# Patient Record
Sex: FEMALE | Race: WHITE | HISPANIC OR LATINO | Employment: FULL TIME | URBAN - METROPOLITAN AREA
[De-identification: names, ages, dates, MRNs, and addresses within clinical notes are randomized per-mention and may not be internally consistent; named-entity substitution may affect disease eponyms.]

---

## 2017-06-27 ENCOUNTER — HOSPITAL ENCOUNTER (OUTPATIENT)
Dept: LAB | Facility: MEDICAL CENTER | Age: 27
End: 2017-06-27
Attending: OBSTETRICS & GYNECOLOGY
Payer: COMMERCIAL

## 2017-06-27 LAB — B-HCG SERPL-ACNC: 14.4 MIU/ML (ref 0–5)

## 2017-06-27 PROCEDURE — 84702 CHORIONIC GONADOTROPIN TEST: CPT

## 2017-09-06 ENCOUNTER — HOSPITAL ENCOUNTER (OUTPATIENT)
Facility: MEDICAL CENTER | Age: 27
End: 2017-09-06
Attending: OBSTETRICS & GYNECOLOGY
Payer: COMMERCIAL

## 2017-09-06 LAB
ABO GROUP BLD: NORMAL
BASOPHILS # BLD AUTO: 0.2 % (ref 0–1.8)
BASOPHILS # BLD: 0.02 K/UL (ref 0–0.12)
BLD GP AB SCN SERPL QL: NORMAL
EOSINOPHIL # BLD AUTO: 0.04 K/UL (ref 0–0.51)
EOSINOPHIL NFR BLD: 0.4 % (ref 0–6.9)
ERYTHROCYTE [DISTWIDTH] IN BLOOD BY AUTOMATED COUNT: 45.4 FL (ref 35.9–50)
HBV SURFACE AG SER QL: NEGATIVE
HCT VFR BLD AUTO: 42.9 % (ref 37–47)
HGB BLD-MCNC: 13.8 G/DL (ref 12–16)
HIV 1+2 AB+HIV1 P24 AG SERPL QL IA: NON REACTIVE
IMM GRANULOCYTES # BLD AUTO: 0.04 K/UL (ref 0–0.11)
IMM GRANULOCYTES NFR BLD AUTO: 0.4 % (ref 0–0.9)
LYMPHOCYTES # BLD AUTO: 1.87 K/UL (ref 1–4.8)
LYMPHOCYTES NFR BLD: 19.4 % (ref 22–41)
MCH RBC QN AUTO: 31.3 PG (ref 27–33)
MCHC RBC AUTO-ENTMCNC: 32.2 G/DL (ref 33.6–35)
MCV RBC AUTO: 97.3 FL (ref 81.4–97.8)
MONOCYTES # BLD AUTO: 0.62 K/UL (ref 0–0.85)
MONOCYTES NFR BLD AUTO: 6.4 % (ref 0–13.4)
NEUTROPHILS # BLD AUTO: 7.06 K/UL (ref 2–7.15)
NEUTROPHILS NFR BLD: 73.2 % (ref 44–72)
NRBC # BLD AUTO: 0 K/UL
NRBC BLD AUTO-RTO: 0 /100 WBC
PLATELET # BLD AUTO: 245 K/UL (ref 164–446)
PMV BLD AUTO: 12.3 FL (ref 9–12.9)
RBC # BLD AUTO: 4.41 M/UL (ref 4.2–5.4)
RH BLD: NORMAL
RUBV AB SER QL: 112.2 IU/ML
TREPONEMA PALLIDUM IGG+IGM AB [PRESENCE] IN SERUM OR PLASMA BY IMMUNOASSAY: NON REACTIVE
WBC # BLD AUTO: 9.7 K/UL (ref 4.8–10.8)

## 2017-09-06 PROCEDURE — 86901 BLOOD TYPING SEROLOGIC RH(D): CPT

## 2017-09-06 PROCEDURE — 87077 CULTURE AEROBIC IDENTIFY: CPT

## 2017-09-06 PROCEDURE — 87186 SC STD MICRODIL/AGAR DIL: CPT

## 2017-09-06 PROCEDURE — 86850 RBC ANTIBODY SCREEN: CPT

## 2017-09-06 PROCEDURE — 87340 HEPATITIS B SURFACE AG IA: CPT

## 2017-09-06 PROCEDURE — 86762 RUBELLA ANTIBODY: CPT

## 2017-09-06 PROCEDURE — 85025 COMPLETE CBC W/AUTO DIFF WBC: CPT

## 2017-09-06 PROCEDURE — 87389 HIV-1 AG W/HIV-1&-2 AB AG IA: CPT

## 2017-09-06 PROCEDURE — 86780 TREPONEMA PALLIDUM: CPT

## 2017-09-06 PROCEDURE — 81220 CFTR GENE COM VARIANTS: CPT

## 2017-09-06 PROCEDURE — 87086 URINE CULTURE/COLONY COUNT: CPT

## 2017-09-06 PROCEDURE — 86900 BLOOD TYPING SEROLOGIC ABO: CPT

## 2017-09-09 LAB
BACTERIA UR CULT: ABNORMAL
SIGNIFICANT IND 70042: ABNORMAL
SOURCE SOURCE: ABNORMAL

## 2017-09-10 LAB
CF EXPANDED VARIANT PANEL INTERP Q4864: NORMAL
CFTR ALLELE 1 BLD/T QL: NEGATIVE
CFTR ALLELE 1 BLD/T QL: NEGATIVE
CFTR MUT ANL BLD/T: NORMAL

## 2017-10-02 ENCOUNTER — HOSPITAL ENCOUNTER (OUTPATIENT)
Dept: LAB | Facility: MEDICAL CENTER | Age: 27
End: 2017-10-02
Attending: OBSTETRICS & GYNECOLOGY
Payer: COMMERCIAL

## 2017-10-02 PROCEDURE — 81508 FTL CGEN ABNOR TWO PROTEINS: CPT

## 2017-10-02 PROCEDURE — 36415 COLL VENOUS BLD VENIPUNCTURE: CPT

## 2017-11-02 ENCOUNTER — HOSPITAL ENCOUNTER (OUTPATIENT)
Facility: MEDICAL CENTER | Age: 27
End: 2017-11-02
Attending: OBSTETRICS & GYNECOLOGY
Payer: COMMERCIAL

## 2017-11-02 PROCEDURE — 81511 FTL CGEN ABNOR FOUR ANAL: CPT

## 2018-01-24 ENCOUNTER — HOSPITAL ENCOUNTER (OUTPATIENT)
Facility: MEDICAL CENTER | Age: 28
End: 2018-01-24
Attending: OBSTETRICS & GYNECOLOGY
Payer: COMMERCIAL

## 2018-01-24 LAB
ERYTHROCYTE [DISTWIDTH] IN BLOOD BY AUTOMATED COUNT: 46.5 FL (ref 35.9–50)
GLUCOSE 1H P 50 G GLC PO SERPL-MCNC: 114 MG/DL (ref 70–139)
HCT VFR BLD AUTO: 36.5 % (ref 37–47)
HGB BLD-MCNC: 11.5 G/DL (ref 12–16)
MCH RBC QN AUTO: 30.3 PG (ref 27–33)
MCHC RBC AUTO-ENTMCNC: 31.5 G/DL (ref 33.6–35)
MCV RBC AUTO: 96.1 FL (ref 81.4–97.8)
PLATELET # BLD AUTO: 342 K/UL (ref 164–446)
PMV BLD AUTO: 11.1 FL (ref 9–12.9)
RBC # BLD AUTO: 3.8 M/UL (ref 4.2–5.4)
WBC # BLD AUTO: 9.8 K/UL (ref 4.8–10.8)

## 2018-01-24 PROCEDURE — 82950 GLUCOSE TEST: CPT

## 2018-01-24 PROCEDURE — 85027 COMPLETE CBC AUTOMATED: CPT

## 2018-03-28 ENCOUNTER — HOSPITAL ENCOUNTER (INPATIENT)
Facility: MEDICAL CENTER | Age: 28
LOS: 3 days | End: 2018-03-31
Attending: OBSTETRICS & GYNECOLOGY | Admitting: OBSTETRICS & GYNECOLOGY
Payer: COMMERCIAL

## 2018-03-28 DIAGNOSIS — G89.18 ACUTE POST-OPERATIVE PAIN: ICD-10-CM

## 2018-03-28 DIAGNOSIS — Z98.891 STATUS POST PRIMARY LOW TRANSVERSE CESAREAN SECTION: ICD-10-CM

## 2018-03-28 LAB
BASOPHILS # BLD AUTO: 0.2 % (ref 0–1.8)
BASOPHILS # BLD: 0.02 K/UL (ref 0–0.12)
EOSINOPHIL # BLD AUTO: 0.1 K/UL (ref 0–0.51)
EOSINOPHIL NFR BLD: 0.9 % (ref 0–6.9)
ERYTHROCYTE [DISTWIDTH] IN BLOOD BY AUTOMATED COUNT: 47.4 FL (ref 35.9–50)
ERYTHROCYTE [DISTWIDTH] IN BLOOD BY AUTOMATED COUNT: 48.2 FL (ref 35.9–50)
HCT VFR BLD AUTO: 33.1 % (ref 37–47)
HCT VFR BLD AUTO: 39 % (ref 37–47)
HGB BLD-MCNC: 11.1 G/DL (ref 12–16)
HGB BLD-MCNC: 13.1 G/DL (ref 12–16)
HOLDING TUBE BB 8507: NORMAL
IMM GRANULOCYTES # BLD AUTO: 0.05 K/UL (ref 0–0.11)
IMM GRANULOCYTES NFR BLD AUTO: 0.5 % (ref 0–0.9)
LYMPHOCYTES # BLD AUTO: 2.26 K/UL (ref 1–4.8)
LYMPHOCYTES NFR BLD: 20.7 % (ref 22–41)
MCH RBC QN AUTO: 30.5 PG (ref 27–33)
MCH RBC QN AUTO: 30.6 PG (ref 27–33)
MCHC RBC AUTO-ENTMCNC: 33.5 G/DL (ref 33.6–35)
MCHC RBC AUTO-ENTMCNC: 33.6 G/DL (ref 33.6–35)
MCV RBC AUTO: 90.7 FL (ref 81.4–97.8)
MCV RBC AUTO: 91.2 FL (ref 81.4–97.8)
MONOCYTES # BLD AUTO: 0.8 K/UL (ref 0–0.85)
MONOCYTES NFR BLD AUTO: 7.3 % (ref 0–13.4)
NEUTROPHILS # BLD AUTO: 7.68 K/UL (ref 2–7.15)
NEUTROPHILS NFR BLD: 70.4 % (ref 44–72)
NRBC # BLD AUTO: 0 K/UL
NRBC BLD-RTO: 0 /100 WBC
PLATELET # BLD AUTO: 215 K/UL (ref 164–446)
PLATELET # BLD AUTO: 229 K/UL (ref 164–446)
PMV BLD AUTO: 12.2 FL (ref 9–12.9)
PMV BLD AUTO: 12.2 FL (ref 9–12.9)
RBC # BLD AUTO: 3.63 M/UL (ref 4.2–5.4)
RBC # BLD AUTO: 4.3 M/UL (ref 4.2–5.4)
WBC # BLD AUTO: 10.9 K/UL (ref 4.8–10.8)
WBC # BLD AUTO: 19.5 K/UL (ref 4.8–10.8)

## 2018-03-28 PROCEDURE — 700102 HCHG RX REV CODE 250 W/ 637 OVERRIDE(OP)

## 2018-03-28 PROCEDURE — 85025 COMPLETE CBC W/AUTO DIFF WBC: CPT

## 2018-03-28 PROCEDURE — 700111 HCHG RX REV CODE 636 W/ 250 OVERRIDE (IP)

## 2018-03-28 PROCEDURE — 770002 HCHG ROOM/CARE - OB PRIVATE (112)

## 2018-03-28 PROCEDURE — 303615 HCHG EPIDURAL/SPINAL ANESTHESIA FOR LABOR

## 2018-03-28 PROCEDURE — 36415 COLL VENOUS BLD VENIPUNCTURE: CPT

## 2018-03-28 PROCEDURE — 305385 HCHG SURGICAL SERVICES 1/4 HOUR: Performed by: OBSTETRICS & GYNECOLOGY

## 2018-03-28 PROCEDURE — A9270 NON-COVERED ITEM OR SERVICE: HCPCS | Performed by: ANESTHESIOLOGY

## 2018-03-28 PROCEDURE — 59514 CESAREAN DELIVERY ONLY: CPT

## 2018-03-28 PROCEDURE — 700105 HCHG RX REV CODE 258: Performed by: OBSTETRICS & GYNECOLOGY

## 2018-03-28 PROCEDURE — 304966 HCHG RECOVERY SVSC TIME ADDL 1/2 HR: Performed by: OBSTETRICS & GYNECOLOGY

## 2018-03-28 PROCEDURE — 306828 HCHG ANES-TIME GENERAL: Performed by: OBSTETRICS & GYNECOLOGY

## 2018-03-28 PROCEDURE — 85027 COMPLETE CBC AUTOMATED: CPT

## 2018-03-28 PROCEDURE — 700101 HCHG RX REV CODE 250

## 2018-03-28 PROCEDURE — 304964 HCHG RECOVERY ROOM TIME 1HR: Performed by: OBSTETRICS & GYNECOLOGY

## 2018-03-28 PROCEDURE — 700102 HCHG RX REV CODE 250 W/ 637 OVERRIDE(OP): Performed by: ANESTHESIOLOGY

## 2018-03-28 RX ORDER — BUPIVACAINE HYDROCHLORIDE 2.5 MG/ML
INJECTION, SOLUTION EPIDURAL; INFILTRATION; INTRACAUDAL
Status: COMPLETED
Start: 2018-03-28 | End: 2018-03-28

## 2018-03-28 RX ORDER — ONDANSETRON 4 MG/1
4 TABLET, ORALLY DISINTEGRATING ORAL EVERY 6 HOURS PRN
Status: DISCONTINUED | OUTPATIENT
Start: 2018-03-29 | End: 2018-03-31 | Stop reason: HOSPADM

## 2018-03-28 RX ORDER — OXYCODONE AND ACETAMINOPHEN 10; 325 MG/1; MG/1
1 TABLET ORAL EVERY 4 HOURS PRN
Status: DISCONTINUED | OUTPATIENT
Start: 2018-03-28 | End: 2018-03-28

## 2018-03-28 RX ORDER — ONDANSETRON 2 MG/ML
4 INJECTION INTRAMUSCULAR; INTRAVENOUS EVERY 6 HOURS PRN
Status: DISCONTINUED | OUTPATIENT
Start: 2018-03-28 | End: 2018-03-29

## 2018-03-28 RX ORDER — DIPHENHYDRAMINE HYDROCHLORIDE 50 MG/ML
25 INJECTION INTRAMUSCULAR; INTRAVENOUS EVERY 6 HOURS PRN
Status: DISCONTINUED | OUTPATIENT
Start: 2018-03-28 | End: 2018-03-28

## 2018-03-28 RX ORDER — NALOXONE HYDROCHLORIDE 0.4 MG/ML
0.1 INJECTION, SOLUTION INTRAMUSCULAR; INTRAVENOUS; SUBCUTANEOUS PRN
Status: DISCONTINUED | OUTPATIENT
Start: 2018-03-28 | End: 2018-03-29

## 2018-03-28 RX ORDER — OXYCODONE AND ACETAMINOPHEN 10; 325 MG/1; MG/1
1 TABLET ORAL EVERY 4 HOURS PRN
Status: DISCONTINUED | OUTPATIENT
Start: 2018-03-28 | End: 2018-03-29

## 2018-03-28 RX ORDER — METOCLOPRAMIDE HYDROCHLORIDE 5 MG/ML
INJECTION INTRAMUSCULAR; INTRAVENOUS
Status: COMPLETED
Start: 2018-03-28 | End: 2018-03-28

## 2018-03-28 RX ORDER — IBUPROFEN 600 MG/1
600 TABLET ORAL EVERY 6 HOURS PRN
Status: DISCONTINUED | OUTPATIENT
Start: 2018-03-28 | End: 2018-03-28

## 2018-03-28 RX ORDER — OXYCODONE HYDROCHLORIDE AND ACETAMINOPHEN 5; 325 MG/1; MG/1
1 TABLET ORAL EVERY 4 HOURS PRN
Status: DISCONTINUED | OUTPATIENT
Start: 2018-03-28 | End: 2018-03-29

## 2018-03-28 RX ORDER — MORPHINE SULFATE 4 MG/ML
4 INJECTION, SOLUTION INTRAMUSCULAR; INTRAVENOUS
Status: DISCONTINUED | OUTPATIENT
Start: 2018-03-29 | End: 2018-03-31 | Stop reason: HOSPADM

## 2018-03-28 RX ORDER — OXYCODONE AND ACETAMINOPHEN 10; 325 MG/1; MG/1
1 TABLET ORAL EVERY 4 HOURS PRN
Status: DISCONTINUED | OUTPATIENT
Start: 2018-03-29 | End: 2018-03-29

## 2018-03-28 RX ORDER — MAG HYDROX/ALUMINUM HYD/SIMETH 400-400-40
1 SUSPENSION, ORAL (FINAL DOSE FORM) ORAL
Status: DISCONTINUED | OUTPATIENT
Start: 2018-03-28 | End: 2018-03-31 | Stop reason: HOSPADM

## 2018-03-28 RX ORDER — DIPHENHYDRAMINE HYDROCHLORIDE 50 MG/ML
25 INJECTION INTRAMUSCULAR; INTRAVENOUS EVERY 6 HOURS PRN
Status: DISCONTINUED | OUTPATIENT
Start: 2018-03-29 | End: 2018-03-31 | Stop reason: HOSPADM

## 2018-03-28 RX ORDER — IBUPROFEN 600 MG/1
600 TABLET ORAL EVERY 6 HOURS PRN
Status: DISCONTINUED | OUTPATIENT
Start: 2018-03-29 | End: 2018-03-31 | Stop reason: HOSPADM

## 2018-03-28 RX ORDER — SIMETHICONE 80 MG
80 TABLET,CHEWABLE ORAL 4 TIMES DAILY PRN
Status: DISCONTINUED | OUTPATIENT
Start: 2018-03-28 | End: 2018-03-31 | Stop reason: HOSPADM

## 2018-03-28 RX ORDER — ONDANSETRON 2 MG/ML
4 INJECTION INTRAMUSCULAR; INTRAVENOUS EVERY 6 HOURS PRN
Status: DISCONTINUED | OUTPATIENT
Start: 2018-03-29 | End: 2018-03-31 | Stop reason: HOSPADM

## 2018-03-28 RX ORDER — TERBUTALINE SULFATE 1 MG/ML
INJECTION, SOLUTION SUBCUTANEOUS
Status: COMPLETED
Start: 2018-03-28 | End: 2018-03-28

## 2018-03-28 RX ORDER — DIPHENHYDRAMINE HCL 25 MG
25 TABLET ORAL EVERY 6 HOURS PRN
Status: DISCONTINUED | OUTPATIENT
Start: 2018-03-29 | End: 2018-03-31 | Stop reason: HOSPADM

## 2018-03-28 RX ORDER — DIPHENHYDRAMINE HCL 25 MG
25 TABLET ORAL EVERY 6 HOURS PRN
Status: DISCONTINUED | OUTPATIENT
Start: 2018-03-28 | End: 2018-03-28

## 2018-03-28 RX ORDER — CARBOPROST TROMETHAMINE 250 UG/ML
250 INJECTION, SOLUTION INTRAMUSCULAR
Status: DISCONTINUED | OUTPATIENT
Start: 2018-03-28 | End: 2018-03-31 | Stop reason: HOSPADM

## 2018-03-28 RX ORDER — VITAMIN A ACETATE, BETA CAROTENE, ASCORBIC ACID, CHOLECALCIFEROL, .ALPHA.-TOCOPHEROL ACETATE, DL-, THIAMINE MONONITRATE, RIBOFLAVIN, NIACINAMIDE, PYRIDOXINE HYDROCHLORIDE, FOLIC ACID, CYANOCOBALAMIN, CALCIUM CARBONATE, FERROUS FUMARATE, ZINC OXIDE, CUPRIC OXIDE 3080; 12; 120; 400; 1; 1.84; 3; 20; 22; 920; 25; 200; 27; 10; 2 [IU]/1; UG/1; MG/1; [IU]/1; MG/1; MG/1; MG/1; MG/1; MG/1; [IU]/1; MG/1; MG/1; MG/1; MG/1; MG/1
1 TABLET, FILM COATED ORAL EVERY MORNING
Status: DISCONTINUED | OUTPATIENT
Start: 2018-03-29 | End: 2018-03-31 | Stop reason: HOSPADM

## 2018-03-28 RX ORDER — OXYCODONE HYDROCHLORIDE AND ACETAMINOPHEN 5; 325 MG/1; MG/1
1 TABLET ORAL EVERY 4 HOURS PRN
Status: DISCONTINUED | OUTPATIENT
Start: 2018-03-28 | End: 2018-03-28

## 2018-03-28 RX ORDER — ONDANSETRON 2 MG/ML
4 INJECTION INTRAMUSCULAR; INTRAVENOUS EVERY 6 HOURS PRN
Status: DISCONTINUED | OUTPATIENT
Start: 2018-03-28 | End: 2018-03-28

## 2018-03-28 RX ORDER — ONDANSETRON 4 MG/1
4 TABLET, ORALLY DISINTEGRATING ORAL EVERY 6 HOURS PRN
Status: DISCONTINUED | OUTPATIENT
Start: 2018-03-28 | End: 2018-03-28

## 2018-03-28 RX ORDER — SODIUM CHLORIDE, SODIUM LACTATE, POTASSIUM CHLORIDE, CALCIUM CHLORIDE 600; 310; 30; 20 MG/100ML; MG/100ML; MG/100ML; MG/100ML
INJECTION, SOLUTION INTRAVENOUS CONTINUOUS
Status: DISPENSED | OUTPATIENT
Start: 2018-03-28 | End: 2018-03-28

## 2018-03-28 RX ORDER — ROPIVACAINE HYDROCHLORIDE 2 MG/ML
INJECTION, SOLUTION EPIDURAL; INFILTRATION; PERINEURAL
Status: COMPLETED
Start: 2018-03-28 | End: 2018-03-28

## 2018-03-28 RX ORDER — METOCLOPRAMIDE HYDROCHLORIDE 5 MG/ML
10 INJECTION INTRAMUSCULAR; INTRAVENOUS EVERY 6 HOURS PRN
Status: DISCONTINUED | OUTPATIENT
Start: 2018-03-28 | End: 2018-03-31 | Stop reason: HOSPADM

## 2018-03-28 RX ORDER — OXYCODONE HYDROCHLORIDE AND ACETAMINOPHEN 5; 325 MG/1; MG/1
1 TABLET ORAL EVERY 4 HOURS PRN
Status: DISCONTINUED | OUTPATIENT
Start: 2018-03-29 | End: 2018-03-29

## 2018-03-28 RX ORDER — DOCUSATE SODIUM 100 MG/1
100 CAPSULE, LIQUID FILLED ORAL 2 TIMES DAILY PRN
Status: DISCONTINUED | OUTPATIENT
Start: 2018-03-28 | End: 2018-03-31 | Stop reason: HOSPADM

## 2018-03-28 RX ORDER — SODIUM CHLORIDE, SODIUM LACTATE, POTASSIUM CHLORIDE, CALCIUM CHLORIDE 600; 310; 30; 20 MG/100ML; MG/100ML; MG/100ML; MG/100ML
INJECTION, SOLUTION INTRAVENOUS PRN
Status: DISCONTINUED | OUTPATIENT
Start: 2018-03-28 | End: 2018-03-31 | Stop reason: HOSPADM

## 2018-03-28 RX ORDER — ACETAMINOPHEN 325 MG/1
325 TABLET ORAL EVERY 4 HOURS PRN
Status: DISCONTINUED | OUTPATIENT
Start: 2018-03-28 | End: 2018-03-31 | Stop reason: HOSPADM

## 2018-03-28 RX ORDER — MORPHINE SULFATE 4 MG/ML
4 INJECTION, SOLUTION INTRAMUSCULAR; INTRAVENOUS
Status: DISCONTINUED | OUTPATIENT
Start: 2018-03-28 | End: 2018-03-28

## 2018-03-28 RX ORDER — MISOPROSTOL 200 UG/1
600 TABLET ORAL
Status: DISCONTINUED | OUTPATIENT
Start: 2018-03-28 | End: 2018-03-31 | Stop reason: HOSPADM

## 2018-03-28 RX ORDER — BISACODYL 10 MG
10 SUPPOSITORY, RECTAL RECTAL PRN
Status: DISCONTINUED | OUTPATIENT
Start: 2018-03-28 | End: 2018-03-31 | Stop reason: HOSPADM

## 2018-03-28 RX ORDER — CITRIC ACID/SODIUM CITRATE 334-500MG
SOLUTION, ORAL ORAL
Status: COMPLETED
Start: 2018-03-28 | End: 2018-03-28

## 2018-03-28 RX ORDER — DIPHENHYDRAMINE HYDROCHLORIDE 50 MG/ML
12.5 INJECTION INTRAMUSCULAR; INTRAVENOUS EVERY 6 HOURS PRN
Status: DISCONTINUED | OUTPATIENT
Start: 2018-03-28 | End: 2018-03-29

## 2018-03-28 RX ADMIN — METOCLOPRAMIDE 10 MG: 5 INJECTION, SOLUTION INTRAMUSCULAR; INTRAVENOUS at 14:03

## 2018-03-28 RX ADMIN — SODIUM CHLORIDE, POTASSIUM CHLORIDE, SODIUM LACTATE AND CALCIUM CHLORIDE: 600; 310; 30; 20 INJECTION, SOLUTION INTRAVENOUS at 06:05

## 2018-03-28 RX ADMIN — OXYCODONE HYDROCHLORIDE AND ACETAMINOPHEN 1 TABLET: 10; 325 TABLET ORAL at 22:33

## 2018-03-28 RX ADMIN — SODIUM CHLORIDE, POTASSIUM CHLORIDE, SODIUM LACTATE AND CALCIUM CHLORIDE: 600; 310; 30; 20 INJECTION, SOLUTION INTRAVENOUS at 14:03

## 2018-03-28 RX ADMIN — ROPIVACAINE HYDROCHLORIDE 100 ML: 2 INJECTION, SOLUTION EPIDURAL; INFILTRATION at 06:28

## 2018-03-28 RX ADMIN — FENTANYL CITRATE: 50 INJECTION, SOLUTION INTRAMUSCULAR; INTRAVENOUS at 06:30

## 2018-03-28 RX ADMIN — SODIUM CITRATE AND CITRIC ACID MONOHYDRATE 30 ML: 500; 334 SOLUTION ORAL at 14:03

## 2018-03-28 RX ADMIN — BUPIVACAINE HYDROCHLORIDE: 2.5 INJECTION, SOLUTION EPIDURAL; INFILTRATION; INTRACAUDAL; PERINEURAL at 06:30

## 2018-03-28 RX ADMIN — SODIUM CHLORIDE, POTASSIUM CHLORIDE, SODIUM LACTATE AND CALCIUM CHLORIDE: 600; 310; 30; 20 INJECTION, SOLUTION INTRAVENOUS at 05:00

## 2018-03-28 RX ADMIN — Medication 20 UNITS: at 15:46

## 2018-03-28 ASSESSMENT — LIFESTYLE VARIABLES
DO YOU DRINK ALCOHOL: NO
EVER_SMOKED: NEVER
ALCOHOL_USE: NO

## 2018-03-28 ASSESSMENT — PAIN SCALES - GENERAL
PAINLEVEL_OUTOF10: 0
PAINLEVEL_OUTOF10: 1
PAINLEVEL_OUTOF10: 0
PAINLEVEL_OUTOF10: 1
PAINLEVEL_OUTOF10: 0

## 2018-03-28 NOTE — PROGRESS NOTES
26yo  EDC 2018 38w4d present here today for SROM that she states happened at 0200am this morning. Pt denies problems during her pregnancy. Denies VB. Pt states shes had UC's since her SROM, is breathing through her contractions. Report +FM.    0340: Sterile Speculum exam, gross pooling noted. SVE, /-2 done by WON Momin RN. Dr. Leigh notified. Admission orders received.    0400: IV started and labs sent.0409: Pt transferred to L&D room 213. Report given to WALTER Camilo RN.

## 2018-03-28 NOTE — CARE PLAN
Problem: Infection  Goal: Will remain free from infection  Outcome: PROGRESSING AS EXPECTED  ROM: clear, no foul odor. Afebrile. Wisdom inserted w/ sterile field.     Problem: Pain  Goal: Alleviation of Pain or a reduction in pain to the patient's comfort goal  Outcome: PROGRESSING AS EXPECTED  Epidural in place. Pt states no pain at this time. Will continue to monitor and reposition for comfort.

## 2018-03-28 NOTE — PROGRESS NOTES
Progress Note    Epidural in place. Comfortable. Continued leaking fluid. SVE 6/C/-1. Tocometer with contractions every 2-3 minutes. Will continue to monitor.

## 2018-03-28 NOTE — OP REPORT
DATE OF SERVICE:  2018    PREOPERATIVE DIAGNOSES:  1.  Term intrauterine pregnancy at 38 weeks and 1 day.  2.  Spontaneous rupture of membranes.  3.  Arrest of descent in second stage labor.  4.  Suspected fetal malposition.    POSTOPERATIVE DIAGNOSES:  1.  Term intrauterine pregnancy at 38 weeks and 1 day.  2.  Spontaneous rupture of membranes.  3.  Arrest of descent in second stage labor.  4.  Suspected fetal malposition.    PROCEDURE:  Primary low transverse  via Pfannenstiel incision.    SURGEON:  Kimmie Hinds MD    ASSISTANT:  Simeon Singh MD    ANESTHESIA:  Epidural.    ESTIMATED BLOOD LOSS:  1000 mL    IV FLUIDS:  1700 mL    URINE OUTPUT:  35 mL    SPECIMENS:  None.    COMPLICATIONS:  Left lateral uterine extension.    DISPOSITION:  To PACU stable.    INTRAOPERATIVE FINDINGS:  Normal-appearing uterus, fallopian tubes and ovaries   bilaterally.  Male infant in cephalic presentation, occiput transverse   position with Apgars of 8 and 9, spontaneous cry at delivery.    INDICATIONS AND CONSENT:  The patient is a 27-year-old  who presented at   38 weeks and 1 day with complaints of contractions and leaking fluid.  She is   found to be spontaneously ruptured and in labor.  She was admitted.  She   received an epidural for anesthesia and she progressed into labor without any   further augmentation to complete dilation where she was noted to be at +1   station.  Pushing efforts were initiated.  She pushed for approximately an  hour without descent. Occiput posterior position was suspected.  She was then pushed   in left lateral position.  She was moved to all-fours position and did continue   her pushing efforts.  When she was prone again, evaluation of fetal position   was thought to be unchanged.  She continued her pushing efforts for   approximately 30 minutes without fetal descent.  At this point, she was   requesting  delivery.  I discussed the risks, benefits and    alternatives of primary low transverse  delivery via Pfannenstiel   incision.  The patient stated understanding and desired to proceed.    PROCEDURE IN DETAIL:  The patient was taken back to the operating room where   her epidural anesthesia was bolused.  She was prepped and draped in dorsal   supine position with leftward tilt.  A time-out was performed.  A Pfannenstiel   incision was made on her skin with a scalpel.  The incision was carried down   to the fascia with the Bovie.  The fascia was incised and extended laterally.    The superior and inferior aspects of the fascia were grasped with Kocher   clamps and the rectus muscles were dissected off.  The rectus muscles were    and the peritoneum was entered bluntly.  The incision was extended   superiorly and inferiorly to good visualization of the bladder.  An Sandro O   retractor was placed.  An incision was made in the lower uterine segment with   a scalpel.  The incision was extended superiorly and inferiorly.  Deep transverse arrest   was noted.  With gentle pressure, suction was released around the head and it    was flexed to the hysterotomy. It was delivered with gentle fundal pressure.  The   remainder of the body was delivered without difficulty.  The cord was clamped   and cut.  The infant was handed to NICU.  The placenta was removed with   gentle traction.  The interior lining of the uterus was wiped clean with moist   laparotomy sponges and the uterus was examined.  An extension was noted down   to the cervix on the left side, so the uterus was exteriorized for better   visualization.  The upper and lower uterine segments were reapproximated in   the middle with 0 chromic and was carried out to the right hand side.  Good   hemostasis was noted.  An additional reapproximation was performed in the   center and the incision was carried down to the left side.  At this point, the   extension down into the cervix was appreciated.  The  edges were   reapproximated and sewing up towards the original hysterotomy with 0 chromic,   this extension was reapproximated.  Good hemostasis was noted.  The anterior   leaf of the broad ligament was then reapproximated side-to-side through the   original hysterotomy for additional hemostasis.  Irrigation was performed.    Good hemostasis was noted.  The bilateral fallopian tubes and ovaries were   found to be normal in appearance.  The uterus was replaced into the peritoneal   cavity.  Irrigation was again performed.  The Sandro O retractor was removed.    The peritoneum was reapproximated with 3-0 Vicryl.  The rectus muscles were   reapproximated with 3-0 Vicryl.  The fascia was reapproximated with 0 PDS.    The subcutaneous space was reapproximated with 3-0 Monocryl and the skin was   reapproximated with 4-0 Monocryl.  All counts were correct x2.       ____________________________________     MD JESSI Sanders / JUAN ANTONIO    DD:  03/28/2018 15:32:10  DT:  03/28/2018 16:02:43    D#:  [id number]  Job#:  [id number]

## 2018-03-28 NOTE — PROGRESS NOTES
0700 Received report from Cornel Dorsey. Pt laying in bed w/ no s/s of acute distress. Epidural in place. Pt states no pain. Wisdom cath inserted by this Rn.     0715 Dr. Hinds at bedside. SVE: 6/100%/-2. Dr. Hinds informed of no GBS lab copy on file. MD will fax paperwork.     0830 SVE by this RN and Cornel Burnett: 6/100/-2. Pt repositioned to R side w/ peanut ball.      0930 SVE by Cornel Burnett: 7/100/-1. Pt repositioned to L side. Dr. Hinds updated on cervical change; orders to continue to monitor and frequent position changes.     1030  SVE by Cornel Burnett: 9/100/0.     1045 Dr. Hinds updated on labor status.     1148 Dr. Hinds at bedside. Pushing efforts began w/ pt in supine position w/ L wedge.      1255 Suspected OP position by Dr. Hinds. Pt repositioned to L lateral. Pt pushing well with Dr. Hinds. Dr Hinds continues to be at bedside.     1310 No progress noted w/ fetal descent. Pt repositioned to hands and knees. Pushing efforts continued.     1330 Pt repositioned back to supine. Pushing efforts continued. Dr. Hinds suspecting no fetal position changes. Still suspecting OP or OT position. Pt requesting C/S. Dr. Hinds consented pt for primary c/s.    1400 Prepped for C/S.     1416 Pt to OR #2 for primary C/S.    1700 Pt transferred to PP . Report given to Cornel Gallegos.

## 2018-03-28 NOTE — CARE PLAN
Problem: Pain  Goal: Patient will have relaxed facial expressions and be able to rest between uterine contractions  Outcome: PROGRESSING AS EXPECTED  Resting and relaxed between UCs, reports excellent pain relief with epidural analgesia.     Problem: Risk for Infection, Impaired Wound Healing  Goal: Remain free from signs and symptoms of infection  Outcome: PROGRESSING AS EXPECTED  VSS, no s/s of infection noted upon assessment.

## 2018-03-28 NOTE — PROGRESS NOTES
OB Progress Note    SVE C/C/+1. Pushing efforts began at 11:48. Suspected OP position. At >1 pushing with no descensus, patient placed in left lateral position and pushing efforts continued. No movement noted. She was transitioned to hands and knees and pushing efforts continued. No descensus appreciated. She was placed in supine lithotomy again and pushing efforts were continued. Suspected alternating between OP and OT position. Digital rotation attempted but unsuccessful. Cat II tracing noted but overall reassuring. At 2 hours of pushing, no progress noted and patient is requesting  delivery. Discussed the risks, benefits and alternatives to PLTCS. Patient stated understanding and desires to proceed.

## 2018-03-28 NOTE — PROGRESS NOTES
0400 - report from WON Momin RN. LUIS EDUARDO Bray at bedside. POC discussed, educated on hand hygiene, call light, emergency light, [facility] and [employer]. Educated on fetal monitoring, admission procedures completed. Questions encouraged and answered, understanding verbalized.    0500 - requesting IV be replaced, uncomfortable with current location, concerned with holding baby, breastfeeding. Replaced in left forearm, one attempt, tolerated well.   0615 - care of patient turned over to REID Galdamez for epidural placement.   0645 - care reassumed.   0700 - report to JEB Burnett RN.

## 2018-03-28 NOTE — OP REPORT
Brief Operative Note    Pre Operative Diagnosis:  1. Term intrauterine pregnancy 38w1d  2. SROM  3. Arrest of descent in second stage labor   4. Suspected fetal malposition    Post Operative Diagnosis  1. Term intrauterine pregnancy 38w1d  2. SROM  3. Arrest of descent in second stage labor   4. Suspected fetal malposition    Procedure: Primary low transverse  delivery via Pfannenstiel incision    Surgeon: Guzman RUBIN    Assistant:Francisco RUBIN    Intraoperative findings: Normal appearing uterus, fallopian tubes and ovaries bilaterally, male infant in cephalic presentation, occiput transverse position with APGARS 8/9, spontaneous cry at delivery    Anesthesia:epidural    EBL:1000cc    IVF:1700cc    UOP:35cc    Specimens:none    Complications:left lateral uterine extension    Dispo:to PACU stable    Kimmie Hinds M.D.

## 2018-03-28 NOTE — H&P
DATE OF ADMISSION:  2018    CHIEF COMPLAINT:  Leaking fluid and contractions.    HISTORY OF PRESENT ILLNESS:  This is a 27-year-old  3, para 1 female   due on , presenting at 38-1/2 weeks' gestation with history of   rupture of membranes, which occurred approximately 2 hours prior to admission.    On initial examination in the triage unit, it was confirmed that she had   spontaneous rupture of membranes and was 4 cm dilated.    PRENATAL HISTORY:  The patient had prenatal care with Dr. Kimmie Hinds since   approximately 10 weeks' gestational age.  She has had comprehensive prenatal   care without identified risk factors with negative group B strep status.    OBSTETRICAL HISTORY:  She has had a previous term pregnancy.    MEDICAL HISTORY:  She has no significant medical illnesses or prior   hospitalizations or operations.    ALLERGIES:  No known drug allergies.    FAMILY HISTORY:  Noncontributory.    SOCIAL HISTORY:  She is , works in pharmacy.  Denies drug or tobacco   use.    REVIEW OF SYSTEMS:  She has had no recent fever or vaginal bleeding.    PHYSICAL EXAMINATION:  GENERAL:  She is pleasant, healthy appearing, in no distress.  LUNGS:  Clear.  HEART:  Regular rate and rhythm.  ABDOMEN:  Estimated fetal weight is 7 pounds.  Fetal heart tones are present   with category 1 tracing noted.  GYNECOLOGIC:  Not repeated.    IMPRESSION:  1.  Term pregnancy.  2.  Spontaneous rupture of membranes in active labor.    PLAN:  The patient is admitted, has an epidural anesthetic for pain relief.    We will watch for continued progress and anticipate vaginal delivery.       ____________________________________     MD FLOWER DELA CRUZ / JUAN ANTONIO    DD:  2018 06:53:32  DT:  2018 07:44:44    D#:  [id number]  Job#:  [id number]

## 2018-03-29 PROCEDURE — A9270 NON-COVERED ITEM OR SERVICE: HCPCS | Performed by: OBSTETRICS & GYNECOLOGY

## 2018-03-29 PROCEDURE — A9270 NON-COVERED ITEM OR SERVICE: HCPCS | Performed by: ANESTHESIOLOGY

## 2018-03-29 PROCEDURE — 700112 HCHG RX REV CODE 229: Performed by: OBSTETRICS & GYNECOLOGY

## 2018-03-29 PROCEDURE — 700102 HCHG RX REV CODE 250 W/ 637 OVERRIDE(OP): Performed by: ANESTHESIOLOGY

## 2018-03-29 PROCEDURE — 700102 HCHG RX REV CODE 250 W/ 637 OVERRIDE(OP): Performed by: OBSTETRICS & GYNECOLOGY

## 2018-03-29 PROCEDURE — 770002 HCHG ROOM/CARE - OB PRIVATE (112)

## 2018-03-29 RX ORDER — ONDANSETRON 4 MG/1
4 TABLET, ORALLY DISINTEGRATING ORAL EVERY 6 HOURS PRN
Status: DISCONTINUED | OUTPATIENT
Start: 2018-03-29 | End: 2018-03-31 | Stop reason: HOSPADM

## 2018-03-29 RX ORDER — OXYCODONE AND ACETAMINOPHEN 10; 325 MG/1; MG/1
1 TABLET ORAL EVERY 4 HOURS PRN
Status: DISCONTINUED | OUTPATIENT
Start: 2018-03-29 | End: 2018-03-31 | Stop reason: HOSPADM

## 2018-03-29 RX ORDER — ONDANSETRON 2 MG/ML
4 INJECTION INTRAMUSCULAR; INTRAVENOUS EVERY 6 HOURS PRN
Status: DISCONTINUED | OUTPATIENT
Start: 2018-03-29 | End: 2018-03-31 | Stop reason: HOSPADM

## 2018-03-29 RX ORDER — IBUPROFEN 600 MG/1
600 TABLET ORAL EVERY 6 HOURS PRN
Status: DISCONTINUED | OUTPATIENT
Start: 2018-03-29 | End: 2018-03-31 | Stop reason: HOSPADM

## 2018-03-29 RX ORDER — OXYCODONE HYDROCHLORIDE AND ACETAMINOPHEN 5; 325 MG/1; MG/1
1 TABLET ORAL EVERY 4 HOURS PRN
Status: DISCONTINUED | OUTPATIENT
Start: 2018-03-29 | End: 2018-03-31 | Stop reason: HOSPADM

## 2018-03-29 RX ORDER — ACETAMINOPHEN 325 MG/1
325 TABLET ORAL EVERY 4 HOURS PRN
Status: DISCONTINUED | OUTPATIENT
Start: 2018-03-29 | End: 2018-03-31 | Stop reason: HOSPADM

## 2018-03-29 RX ADMIN — OXYCODONE HYDROCHLORIDE AND ACETAMINOPHEN 1 TABLET: 10; 325 TABLET ORAL at 11:20

## 2018-03-29 RX ADMIN — Medication 1 TABLET: at 11:19

## 2018-03-29 RX ADMIN — OXYCODONE HYDROCHLORIDE AND ACETAMINOPHEN 1 TABLET: 10; 325 TABLET ORAL at 15:40

## 2018-03-29 RX ADMIN — Medication 1 TABLET: at 11:20

## 2018-03-29 RX ADMIN — DOCUSATE SODIUM 100 MG: 100 CAPSULE ORAL at 15:43

## 2018-03-29 RX ADMIN — DOCUSATE SODIUM 100 MG: 100 CAPSULE ORAL at 02:57

## 2018-03-29 RX ADMIN — OXYCODONE HYDROCHLORIDE AND ACETAMINOPHEN 1 TABLET: 10; 325 TABLET ORAL at 20:24

## 2018-03-29 RX ADMIN — IBUPROFEN 600 MG: 600 TABLET, FILM COATED ORAL at 15:44

## 2018-03-29 RX ADMIN — OXYCODONE HYDROCHLORIDE AND ACETAMINOPHEN 1 TABLET: 10; 325 TABLET ORAL at 15:44

## 2018-03-29 RX ADMIN — OXYCODONE HYDROCHLORIDE AND ACETAMINOPHEN 1 TABLET: 10; 325 TABLET ORAL at 07:20

## 2018-03-29 RX ADMIN — OXYCODONE HYDROCHLORIDE AND ACETAMINOPHEN 1 TABLET: 10; 325 TABLET ORAL at 02:58

## 2018-03-29 RX ADMIN — IBUPROFEN 600 MG: 600 TABLET, FILM COATED ORAL at 23:30

## 2018-03-29 ASSESSMENT — PAIN SCALES - GENERAL
PAINLEVEL_OUTOF10: 9
PAINLEVEL_OUTOF10: 7
PAINLEVEL_OUTOF10: 8
PAINLEVEL_OUTOF10: 0
PAINLEVEL_OUTOF10: 0

## 2018-03-29 NOTE — PROGRESS NOTES
1703 - Pt arrived via [facility] with belongings to room S351. Patient transferred from [facility] to hospital bed without difficulties and tolerated well. Report received from CAROLINA Ruiz. Pt oriented to room, call light, emergency light, skylight, & infant security.   1715 - Assessment done. Fundus firm, lochia light. Vital signs stable. IV infusing 125 of pit in L FA. Wisdom catheter in place, draining to gravity yellow urine. Bilateral SCDs in place. Pt denies pain is tolerable, see MAR. Pt aware of dangers related to sleeping with infant, reviewed plan of care with pt. Call light in place. Will continue to monitor

## 2018-03-29 NOTE — CARE PLAN
Problem: Potential for postpartum infection related to surgical incision, compromised uterine condition, urinary tract or respiratory compromise  Goal: Patient will be afebrile and free from signs and symptoms of infection  Outcome: PROGRESSING AS EXPECTED  No signs of infection noted,

## 2018-03-29 NOTE — CARE PLAN
Problem: Altered physiologic condition related to postoperative  delivery  Goal: Patient physiologically stable as evidenced by normal lochia, palpable uterine involution and vital signs within normal limits  Outcome: PROGRESSING AS EXPECTED   Patient in stable condition, vitals WDL, lochia light, and fundus firm.     Problem: Potential for postpartum infection related to surgical incision, compromised uterine condition, urinary tract or respiratory compromise  Goal: Patient will be afebrile and free from signs and symptoms of infection  Outcome: PROGRESSING AS EXPECTED   Patient is free from any s/s of infection at this time. All vitals are WDL. Patient does not appear to be in any kind of distress at this time. Will continue to monitor.

## 2018-03-29 NOTE — PROGRESS NOTES
"OB Post Operative Note    Doing well this morning. Pain controlled. Scant lochia. Breast feeding. Has not ambulated. Fischer remains in place. Tolerating liquids, no flatus as of yet.     Vitals  /72   Pulse 89   Temp 36.9 °C (98.4 °F)   Resp 19   Ht 1.626 m (5' 4\")   Wt 86.6 kg (191 lb)   SpO2 95%   BMI 32.79 kg/m²     Gen: NAD, resting comfortably  GI: soft, non tender to palpation, incision c/d/i with dressing in place  :fundus firm and below umbilicus  Ext: no edema      Recent Labs      03/28/18   0405  03/28/18   2309   WBC  10.9*  19.5*   RBC  4.30  3.63*   HEMOGLOBIN  13.1  11.1*   HEMATOCRIT  39.0  33.1*   MCV  90.7  91.2   MCH  30.5  30.6   RDW  47.4  48.2   PLATELETCT  229  215   MPV  12.2  12.2   NEUTSPOLYS  70.40   --    LYMPHOCYTES  20.70*   --    MONOCYTES  7.30   --    EOSINOPHILS  0.90   --    BASOPHILS  0.20   --        Assessment:  POD day # 1  Doing clinically well    Plan:  Remove fischer today and ambulate  Abdominal binder to bedside  Discharge home on POD# 2-3    Kimmie Hinds M.D.      "

## 2018-03-29 NOTE — CARE PLAN
Problem: Altered physiologic condition related to postoperative  delivery  Goal: Patient physiologically stable as evidenced by normal lochia, palpable uterine involution and vital signs within normal limits  Outcome: PROGRESSING AS EXPECTED  Fundus is firm, lochia is light and vital signs are stable. Will continue to monitor with Q6 hour checks and hourly rounding    Problem: Potential for postpartum infection related to surgical incision, compromised uterine condition, urinary tract or respiratory compromise  Goal: Patient will be afebrile and free from signs and symptoms of infection  Outcome: PROGRESSING AS EXPECTED  Patient is afebrile and does not exhibit any signs/symptoms of infection. Will continue to monitor with Q6 hour checks and hourly rounding

## 2018-03-29 NOTE — PROGRESS NOTES
Assumed care of patient and assessment complete. Patient in stable condition, vitals WDL, fundus firm, lochia light. Discussed plan of care for the day, patient comfortable breastfeeding but would like to see lactation today. Call light in reach, bed in lowest position, encouraged to call if assistance is needed.

## 2018-03-29 NOTE — DISCHARGE PLANNING
:    Referral: Insurance questions.    Intervention:  Met with MOB who had a  with her second child.  Mother stated she is an employee with [employer] and has [facility] insurance but it would cost her $700 per month to add baby onto her insurance.  She stated the FOB works in construction and does not have insurance through his employer.  She stated they are over income by $200 to qualify for [place].  Asked if she had looked into Access to Healthcare and she said she has had it in the past but the coverage was so limited.  Discussed that SW will contact the Patient  (PFA) to assist mother.  Emailed PFA and asked that the follow up with mother for insurance questions.      Plan:  Referred to PFA.

## 2018-03-30 PROBLEM — Z98.891 STATUS POST PRIMARY LOW TRANSVERSE CESAREAN SECTION: Status: ACTIVE | Noted: 2018-03-30

## 2018-03-30 PROBLEM — G89.18 ACUTE POST-OPERATIVE PAIN: Status: ACTIVE | Noted: 2018-03-30

## 2018-03-30 PROCEDURE — A9270 NON-COVERED ITEM OR SERVICE: HCPCS | Performed by: OBSTETRICS & GYNECOLOGY

## 2018-03-30 PROCEDURE — 700102 HCHG RX REV CODE 250 W/ 637 OVERRIDE(OP): Performed by: OBSTETRICS & GYNECOLOGY

## 2018-03-30 PROCEDURE — 700112 HCHG RX REV CODE 229: Performed by: OBSTETRICS & GYNECOLOGY

## 2018-03-30 PROCEDURE — 770002 HCHG ROOM/CARE - OB PRIVATE (112)

## 2018-03-30 RX ORDER — IBUPROFEN 600 MG/1
600 TABLET ORAL EVERY 6 HOURS PRN
Qty: 30 TAB | Refills: 0 | Status: SHIPPED | OUTPATIENT
Start: 2018-03-30 | End: 2022-11-17

## 2018-03-30 RX ORDER — OXYCODONE HYDROCHLORIDE AND ACETAMINOPHEN 5; 325 MG/1; MG/1
1 TABLET ORAL EVERY 4 HOURS PRN
Qty: 20 TAB | Refills: 0 | Status: SHIPPED | OUTPATIENT
Start: 2018-03-30 | End: 2018-04-04

## 2018-03-30 RX ADMIN — IBUPROFEN 600 MG: 600 TABLET, FILM COATED ORAL at 14:16

## 2018-03-30 RX ADMIN — OXYCODONE HYDROCHLORIDE AND ACETAMINOPHEN 1 TABLET: 10; 325 TABLET ORAL at 06:09

## 2018-03-30 RX ADMIN — Medication 1 TABLET: at 10:39

## 2018-03-30 RX ADMIN — OXYCODONE HYDROCHLORIDE AND ACETAMINOPHEN 1 TABLET: 10; 325 TABLET ORAL at 10:39

## 2018-03-30 RX ADMIN — IBUPROFEN 600 MG: 600 TABLET, FILM COATED ORAL at 22:11

## 2018-03-30 RX ADMIN — OXYCODONE HYDROCHLORIDE AND ACETAMINOPHEN 1 TABLET: 10; 325 TABLET ORAL at 14:59

## 2018-03-30 RX ADMIN — DOCUSATE SODIUM 100 MG: 100 CAPSULE ORAL at 10:39

## 2018-03-30 RX ADMIN — OXYCODONE HYDROCHLORIDE AND ACETAMINOPHEN 1 TABLET: 10; 325 TABLET ORAL at 19:04

## 2018-03-30 RX ADMIN — IBUPROFEN 600 MG: 600 TABLET, FILM COATED ORAL at 06:09

## 2018-03-30 RX ADMIN — OXYCODONE HYDROCHLORIDE AND ACETAMINOPHEN 1 TABLET: 10; 325 TABLET ORAL at 14:58

## 2018-03-30 RX ADMIN — OXYCODONE HYDROCHLORIDE AND ACETAMINOPHEN 1 TABLET: 10; 325 TABLET ORAL at 01:26

## 2018-03-30 RX ADMIN — IBUPROFEN 600 MG: 600 TABLET, FILM COATED ORAL at 14:14

## 2018-03-30 ASSESSMENT — PAIN SCALES - GENERAL
PAINLEVEL_OUTOF10: 5
PAINLEVEL_OUTOF10: 8
PAINLEVEL_OUTOF10: 7
PAINLEVEL_OUTOF10: 2
PAINLEVEL_OUTOF10: 5

## 2018-03-30 NOTE — CARE PLAN
Problem: Alteration in comfort related to surgical incision and/or after birth pains  Goal: Patient is able to ambulate, care for self and infant with acceptable pain level  Outcome: PROGRESSING AS EXPECTED  Medicated for pain .Pain control being managed, ambulating in the room.

## 2018-03-30 NOTE — DISCHARGE PLANNING
":    Received another consult to see patient for a history of severe depression.  Met with MOB along with  and other child.  Discussed history of depression and MOB denies having depression and stated it was more the \"situation\" she was in at the time.  She stated her  was working on establishing residency and during that time it was very stressful and that was when she was experiencing depression.  MOB denies any symptoms at this time.  SW offered counseling resources and MOB declined and stated she was doing fine and didn't need them.      Plan:  Nothing further at this time.  "

## 2018-03-30 NOTE — PROGRESS NOTES
Aide Cook R.N. Lactation Consultant Signed   Progress Notes Date of Service: 3/30/2018  9:05 AM         []Hide copied text  []Hover for attribution information  This is mother's 2nd baby. She nursed the first for 3 months. Baby had just nursed. According to the I&O board baby has had many sustained feedings and is pooping and peeing adequately. She has no nipple breakdown. Mom/baby will be discharged tomorrow. She has [facility] insurance. Discussed her lactation benefits with that insurance and gave her the info sheet with [employer] phone numbers and Forum hours. Also gave her internet resources.

## 2018-03-30 NOTE — PROGRESS NOTES
"OB Post Operative Note    Pain improving. Positive flatus. Working on breast feeding    Vitals  BP (!) 92/58   Pulse 98   Temp 36.8 °C (98.2 °F)   Resp 20   Ht 1.626 m (5' 4\")   Wt 86.6 kg (191 lb)   SpO2 97%   BMI 32.79 kg/m²     Gen: NAD, resting comfortably  GI: soft, non tender to palpation, incision c/d/i with steri strips in place  :fundus firm and below umbilicus  Ext: no edema    Assessment:  POD day # 2  Doing clinically well    Plan:  Routine post operative care  Discharge home on POD# 3    Kimmie Hinds M.D.      "

## 2018-03-30 NOTE — CONSULTS
Lactation Note:    Initial Consult:    MOB reports a breastfeeding history of nursing first child for 4 months.  MOB states infant is latching without difficulty and denies pain and tissue breakdown at nipples/breasts.  See flow sheet for latch score and assessment.    Encouraged MOB to feed infant on demand per feeding cues and at least 8-12 in a 24 hour period.  Advised MOB not to let infant go more than 3 hours without a feed.  Discussed signs of successful milk transfer as well as what to expect with breastfeeding in the first 24-48-72 hours following delivery.    Nipple care discussed and MOB encouraged to apply colostrum and lanolin cream to breasts should she experience sore and/or cracked nipples.    MOB has [employer] and is aware her insurance will cover the cost of a personal breast pump from the [facility].  MOB states she prefers to use hand expression as method of removing excess milk from breasts and does not want/need a personal breast pump at this time.  MOB also aware of the outpatient lactation assistance available to her through the [facility].  MOB invited to attend breastfeeding support group.    MOB verbalized understanding of all information provided to her and denies having any further questions at this time.  Encouraged MOB to call for assistance as needed.

## 2018-03-30 NOTE — PROGRESS NOTES
Assumed care of patient and assessment complete. Patient in stable condition, vitals WDL, fundus firm, lochia light. Discussed plan of care for the day, patient comfortable with feeding plan. Call light in reach, bed in lowest position, encouraged to call if assistance is needed.

## 2018-03-31 VITALS
DIASTOLIC BLOOD PRESSURE: 66 MMHG | RESPIRATION RATE: 18 BRPM | TEMPERATURE: 98.1 F | BODY MASS INDEX: 32.61 KG/M2 | SYSTOLIC BLOOD PRESSURE: 105 MMHG | OXYGEN SATURATION: 96 % | WEIGHT: 191 LBS | HEIGHT: 64 IN | HEART RATE: 83 BPM

## 2018-03-31 PROCEDURE — A9270 NON-COVERED ITEM OR SERVICE: HCPCS | Performed by: OBSTETRICS & GYNECOLOGY

## 2018-03-31 PROCEDURE — 700102 HCHG RX REV CODE 250 W/ 637 OVERRIDE(OP): Performed by: OBSTETRICS & GYNECOLOGY

## 2018-03-31 PROCEDURE — 700112 HCHG RX REV CODE 229: Performed by: OBSTETRICS & GYNECOLOGY

## 2018-03-31 RX ADMIN — OXYCODONE HYDROCHLORIDE AND ACETAMINOPHEN 1 TABLET: 5; 325 TABLET ORAL at 06:53

## 2018-03-31 RX ADMIN — OXYCODONE HYDROCHLORIDE AND ACETAMINOPHEN 1 TABLET: 10; 325 TABLET ORAL at 11:11

## 2018-03-31 RX ADMIN — OXYCODONE HYDROCHLORIDE AND ACETAMINOPHEN 1 TABLET: 10; 325 TABLET ORAL at 00:14

## 2018-03-31 RX ADMIN — IBUPROFEN 600 MG: 600 TABLET, FILM COATED ORAL at 04:05

## 2018-03-31 RX ADMIN — Medication 1 TABLET: at 08:16

## 2018-03-31 RX ADMIN — DOCUSATE SODIUM 100 MG: 100 CAPSULE ORAL at 11:11

## 2018-03-31 RX ADMIN — SIMETHICONE 80 MG: 80 TABLET, CHEWABLE ORAL at 11:11

## 2018-03-31 ASSESSMENT — PAIN SCALES - GENERAL
PAINLEVEL_OUTOF10: 7
PAINLEVEL_OUTOF10: 2
PAINLEVEL_OUTOF10: 7
PAINLEVEL_OUTOF10: 2

## 2018-03-31 NOTE — DISCHARGE INSTRUCTIONS
POSTPARTUM DISCHARGE INSTRUCTIONS FOR MOM    YOB: 1990   Age: 27 y.o.               Admit Date: 3/28/2018     Discharge Date: 3/31/2018  Attending Doctor:  Kimmie Hinds M.D.                  Allergies:  Patient has no known allergies.    Discharged to home by car. Discharged via wheelchair, hospital escort: Yes.  Special equipment needed: Not Applicable  Belongings with: Personal  Be sure to schedule a follow-up appointment with your primary care doctor or any specialists as instructed.     Discharge Plan:   Diet Plan: Discussed  Activity Level: Discussed  Confirmed Follow up Appointment: Patient to Call and Schedule Appointment  Confirmed Symptoms Management: Discussed  Medication Reconciliation Updated: Yes  Influenza Vaccine Indication: Indicated: Not available from distributor/    REASONS TO CALL YOUR OBSTETRICIAN:  1.   Persistent fever or shaking chills (Temperature higher than 100.4)  2.   Heavy bleeding (soaking more than 1 pad per hour); Passing clots  3.   Foul odor from vagina  4.   Mastitis (Breast infection; breast pain, chills, fever, redness)  5.   Urinary pain, burning or frequency  6.   Episiotomy infection  7.   Abdominal incision infection  8.   Severe depression longer than 24 hours    HAND WASHING  · Prior to handling the baby.  · Before breastfeeding or bottle feeding baby.  · After using the bathroom or changing the baby's diaper.    WOUND CARE  Ask your physician for additional care instructions.  In general:    ·  Incision:      · Keep clean and dry.    · Do NOT lift anything heavier than your baby for up to 6 weeks.    · There should not be any opening or pus.      VAGINAL CARE  · Nothing inside vagina for 6 weeks: no sexual intercourse, tampons or douching.  · Bleeding may continue for 2-4 weeks.  Amount may vary.    · Call your physician for heavy bleeding which means soaking more than 1 pad per hour    BIRTH CONTROL  · It is possible to become  "pregnant at any time after delivery and while breastfeeding.  · Plan to discuss a method of birth control with your physician at your follow up visit. visit.    DIET AND ELIMINATION  · Eating more fiber (bran cereal, fruits, and vegetables) and drinking plenty of fluids will help to avoid constipation.  · Urinary frequency after childbirth is normal.    POSTPARTUM BLUES  During the first few days after birth, you may experience a sense of the \"blues\" which may include impatience, irritability or even crying.  These feeling come and go quickly.  However, as many as 1 in 10 women experience emotional symptoms known as postpartum depression.    Postpartum depression:  May start as early as the second or third day after delivery or take several weeks or months to develop.  Symptoms of \"blues\" are present, but are more intense:  Crying spells; loss of appetite; feelings of hopelessness or loss of control; fear of touching the baby; over concern or no concern at all about the baby; little or no concern about your own appearance/caring for yourself; and/or inability to sleep or excessive sleeping.  Contact your physician if you are experiencing any of these symptoms.    Crisis Hotline:  · [place] Crisis Hotline:  2-463-KOPQQYG  Or 1-652.812.3530  · [place] Crisis Hotline:  1-913.863.4865  Or 638-702-9870    PREVENTING SHAKEN BABY:  If you are angry or stressed, PUT THE BABY IN THE CRIB, step away, take some deep breaths, and wait until you are calm to care for the baby.  DO NOT SHAKE THE BABY.  You are not alone, call a supporter for help.    · Crisis Call Center 24/7 crisis line 086-421-9657 or 1-388.785.2827  · You can also text them, text \"ANSWER\" to 249836    QUIT SMOKING/TOBACCO USE:  I understand the use of any tobacco products increases my chance of suffering from future heart disease and could cause other illnesses which may shorten my life. Quitting the use of tobacco products is the single most important thing I " can do to improve my health. For further information on smoking / tobacco cessation call a Toll Free Quit Line at 1-146.540.6076 (*[employer]) or 1-253.569.8291 (American Lung Association) or you can access the web based program at www.lungusa.org.    · [place] Tobacco Users Help Line:  (678) 143-2482       Toll Free: 1-751.546.8820  · Quit Tobacco Program [facility] (776)374-7576    DEPRESSION / SUICIDE RISK:  As you are discharged from this [facility], it is important to learn how to keep safe from harming yourself.    Recognize the warning signs:  · Abrupt changes in personality, positive or negative- including increase in energy   · Giving away possessions  · Change in eating patterns- significant weight changes-  positive or negative  · Change in sleeping patterns- unable to sleep or sleeping all the time   · Unwillingness or inability to communicate  · Depression  · Unusual sadness, discouragement and loneliness  · Talk of wanting to die  · Neglect of personal appearance   · Rebelliousness- reckless behavior  · Withdrawal from people/activities they love  · Confusion- inability to concentrate     If you or a loved one observes any of these behaviors or has concerns about self-harm, here's what you can do:  · Talk about it- your feelings and reasons for harming yourself  · Remove any means that you might use to hurt yourself (examples: pills, rope, extension cords, firearm)  · Get professional help from the community (Mental Health, Substance Abuse, psychological counseling)  · Do not be alone:Call your Safe Contact- someone whom you trust who will be there for you.  · Call your local CRISIS HOTLINE 393-6287 or 788-541-7087  · Call your local Children's Mobile Crisis Response Team [place] (824) 457-2883 or www.Soraa  · Call the toll free National Suicide Prevention Hotlines   · National Suicide Prevention Lifeline 937-158-XBFA (9221)  · National  [facility] 800-SUICIDE (179-4557)    DISCHARGE SURVEY:  Thank you for choosing [facility].  We hope we provided you with very good care.  You may be receiving a survey in the mail.  Please fill it out.  Your opinion is valuable to us.    ADDITIONAL EDUCATIONAL MATERIALS GIVEN TO PATIENT:  Follow up in 2weeks for incision check with Kimmie Hinds M.D.   Pelvic rest for 6 weeks       My signature on this form indicates that:  1.  I have reviewed and understand the above information  2.  My questions regarding this information have been answered to my satisfaction.  3.  I have formulated a plan with my discharge nurse to obtain my prescribed medication for home.

## 2018-03-31 NOTE — DISCHARGE SUMMARY
Discharge Summary:      Isela Haney      Admit Date:   3/28/2018  Discharge Date:  3/31/2018     Admitting diagnosis:  Pregnancy  Labor and delivery, indication for care  failure to descend  Labor and delivery, indication for care  Discharge Diagnosis: Status post  for failure to progress.  Pregnancy Complications: none  Tubal Ligation:  no        History:  Past Medical History:   Diagnosis Date   • Abnormal Pap smear and cervical HPV (human papillomavirus) 10/12/2010   • Pregnant      OB History    Para Term  AB Living   2 1 1     1   SAB TAB Ectopic Molar Multiple Live Births             1      # Outcome Date GA Lbr Sayna/2nd Weight Sex Delivery Anes PTL Lv   2 Term 11 39w1d  3.033 kg (6 lb 11 oz) M Vag-Spont EPI  SANG      Birth Comments: had a 2 degree tear   1                     Patient has no known allergies.  Patient Active Problem List    Diagnosis Date Noted   • Status post primary low transverse  section 2018     Priority: High   • Acute post-operative pain 2018     Priority: High   • Labor and delivery, indication for care 2018        Hospital Course:   27 y.o. , now para 2, was admitted with the above mentioned diagnosis, underwent Active Labor,  for failure to progress. Patient postpartum course was unremarkable, with progressive advancement in diet , ambulation and toleration of oral analgesia. Patient without complaints today and desires discharge.      Vitals:    18 0800 18 0800   BP: (!) 92/58 (!) 98/61 100/68 105/66   Pulse: 98 89 85 83   Resp: 20 20 20 18   Temp: 36.8 °C (98.2 °F) 37.1 °C (98.7 °F) 36.6 °C (97.9 °F) 36.7 °C (98.1 °F)   TempSrc:       SpO2: 97% 98% 95% 96%   Weight:       Height:           Current Facility-Administered Medications   Medication Dose   • ondansetron (ZOFRAN ODT) dispertab 4 mg  4 mg    Or   • ondansetron (ZOFRAN) syringe/vial injection 4 mg  4 mg    • oxytocin (PITOCIN) infusion (for postpartum)   mL/hr   • ibuprofen (MOTRIN) tablet 600 mg  600 mg   • oxyCODONE-acetaminophen (PERCOCET) 5-325 MG per tablet 1 Tab  1 Tab   • acetaminophen (TYLENOL) tablet 325 mg  325 mg   • oxyCODONE-acetaminophen (PERCOCET-10)  MG per tablet 1 Tab  1 Tab   • LR infusion     • carboPROST (HEMABATE) injection 250 mcg  250 mcg   • miSOPROStol (CYTOTEC) tablet 600 mcg  600 mcg   • docusate sodium (COLACE) capsule 100 mg  100 mg   • bisacodyl (DULCOLAX) suppository 10 mg  10 mg   • glycerin (adult) suppository 1 Suppository  1 Suppository   • magnesium hydroxide (MILK OF MAGNESIA) suspension 30 mL  30 mL   • simethicone (MYLICON) chewable tab 80 mg  80 mg   • prenatal plus vitamin (STUARTNATAL 1+1) 27-1 MG tablet 1 Tab  1 Tab   • acetaminophen (TYLENOL) tablet 325 mg  325 mg   • metoclopramide (REGLAN) injection 10 mg  10 mg   • ibuprofen (MOTRIN) tablet 600 mg  600 mg   • diphenhydrAMINE (BENADRYL) tablet/capsule 25 mg  25 mg    Or   • diphenhydrAMINE (BENADRYL) injection 25 mg  25 mg   • ondansetron (ZOFRAN) syringe/vial injection 4 mg  4 mg    Or   • ondansetron (ZOFRAN ODT) dispertab 4 mg  4 mg   • morphine (pf) 4 mg/ml injection 4 mg  4 mg       Exam:  Gen: NAD, AAO  Abdomen: Abdomen soft, non-tender. No masses,  No organomegally, FF @ U. No rebound/guarding.  Fundus Tender: No  Incision: none  Extremity: 2+ edema, no redness or tenderness in the calves or thighs, 2+DPP, Homans sign is negative, no sign of DVT      Labs:  Recent Labs      03/28/18   2309   WBC  19.5*   RBC  3.63*   HEMOGLOBIN  11.1*   HEMATOCRIT  33.1*   MCV  91.2   MCH  30.6   MCHC  33.5*   RDW  48.2   PLATELETCT  215   MPV  12.2        Activity:   Discharge to home  Pelvic Rest x 6 weeks    Assessment:  normal postpartum course  Discharge Assessment: No heavy bleeding or foul vaginal discharge      Follow up: 2wk for incision check with Kimmie Hinds M.D.      Discharge Meds:   Current  Outpatient Prescriptions   Medication Sig Dispense Refill   • oxyCODONE-acetaminophen (PERCOCET) 5-325 MG Tab Take 1 Tab by mouth every four hours as needed for up to 5 days. 20 Tab 0   • ibuprofen (MOTRIN) 600 MG Tab Take 1 Tab by mouth every 6 hours as needed (For cramping after delivery; do not give if patient is receiving ketorolac (Toradol)). 30 Tab 0       Christiano Sainz M.D.

## 2018-03-31 NOTE — CARE PLAN
Problem: Venous Thromboembolism (VTW)/Deep Vein Thrombosis (DVT) Prevention:  Goal: Patient will participate in Venous Thrombosis (VTE)/Deep Vein Thrombosis (DVT)Prevention Measures  Outcome: PROGRESSING AS EXPECTED  Pt ambulatory. No current s/s of DVT    Problem: Pain Management  Goal: Pain level will decrease to patient's comfort goal  PRN medication administered per MAR. Pt encouraged to call for additional interventions.

## 2018-03-31 NOTE — PROGRESS NOTES
Pt discharged at approximately 1243 via wheelchair with hospital escort. Infant placed in car seat by parent, and checked by RN.  Pt discharge instructions provided at approximately 1050 prescriptions given to patient. Re-educated about feeding supplementation guidelines.  Checked armbands. Clamp and cuddles removed. No further questions at this time.

## 2018-03-31 NOTE — PROGRESS NOTES
Baby has 10% weight loss. Mother has been breastfeeding, supplementing with donor milk then pumping. Mother pumped 10 ml from last pump session. Mother has HTH and will rent HG pump from [employer] [facility] [employer], LC called and put pump on hold for patient to pick-up today. NB booklet given with review. Informed on resources through [facility] 1:1 consults, Forum & personal pump paperwork given (once done with HG pump). Teaching on hunger cues, breastfeeding when baby shows cues or by 3 hours from last feed, importance of skin to skin, attempt latch for 15 minutes then move on to supplementing baby using supplemental guideline volumes (provided with review) then pump x 10-15 minutes and hand express. Mother reports she knows how to hand express. Breastfeeding plan, breastfeed/attempt, supplement with formula then pump & hand express. Encouraged mother to follow-up with [facility] for weight checks.

## 2019-05-06 ENCOUNTER — OFFICE VISIT (OUTPATIENT)
Dept: URGENT CARE | Facility: PHYSICIAN GROUP | Age: 29
End: 2019-05-06
Payer: COMMERCIAL

## 2019-05-06 VITALS
OXYGEN SATURATION: 97 % | SYSTOLIC BLOOD PRESSURE: 112 MMHG | HEART RATE: 106 BPM | HEIGHT: 64 IN | DIASTOLIC BLOOD PRESSURE: 64 MMHG | WEIGHT: 150 LBS | BODY MASS INDEX: 25.61 KG/M2 | TEMPERATURE: 98.7 F

## 2019-05-06 DIAGNOSIS — J02.9 SORE THROAT: ICD-10-CM

## 2019-05-06 DIAGNOSIS — J03.90 TONSILLITIS WITH EXUDATE: ICD-10-CM

## 2019-05-06 DIAGNOSIS — J03.91 RECURRENT TONSILLITIS: ICD-10-CM

## 2019-05-06 LAB
INT CON NEG: NEGATIVE
INT CON POS: POSITIVE
S PYO AG THROAT QL: NORMAL

## 2019-05-06 PROCEDURE — 87880 STREP A ASSAY W/OPTIC: CPT | Performed by: NURSE PRACTITIONER

## 2019-05-06 PROCEDURE — 99214 OFFICE O/P EST MOD 30 MIN: CPT | Performed by: NURSE PRACTITIONER

## 2019-05-06 RX ORDER — DEXAMETHASONE SODIUM PHOSPHATE 4 MG/ML
4 INJECTION, SOLUTION INTRA-ARTICULAR; INTRALESIONAL; INTRAMUSCULAR; INTRAVENOUS; SOFT TISSUE ONCE
Status: COMPLETED | OUTPATIENT
Start: 2019-05-06 | End: 2019-05-06

## 2019-05-06 RX ORDER — AMOXICILLIN AND CLAVULANATE POTASSIUM 875; 125 MG/1; MG/1
1 TABLET, FILM COATED ORAL 2 TIMES DAILY
Qty: 14 TAB | Refills: 0 | Status: SHIPPED | OUTPATIENT
Start: 2019-05-06 | End: 2019-05-13

## 2019-05-06 RX ADMIN — DEXAMETHASONE SODIUM PHOSPHATE 4 MG: 4 INJECTION, SOLUTION INTRA-ARTICULAR; INTRALESIONAL; INTRAMUSCULAR; INTRAVENOUS; SOFT TISSUE at 12:06

## 2019-05-06 ASSESSMENT — ENCOUNTER SYMPTOMS
ABDOMINAL PAIN: 0
HEADACHES: 0
DIZZINESS: 0
CHILLS: 0
SHORTNESS OF BREATH: 0
NAUSEA: 0
NECK PAIN: 0
COUGH: 0
DIARRHEA: 0
CONSTIPATION: 0
WEAKNESS: 0
MYALGIAS: 0
EYE REDNESS: 0
VOMITING: 0
EYE DISCHARGE: 0
WHEEZING: 0
SORE THROAT: 1
FEVER: 0

## 2019-05-06 NOTE — LETTER
May 6, 2019       Patient: Isela Haney   YOB: 1990   Date of Visit: 5/6/2019         To Whom It May Concern:    It is my medical opinion that Isela Haney be excused from work due to illness. May return on 5/9/19.    If you have any questions or concerns, please don't hesitate to call [phone]          Sincerely,          CELSO Wren.N.P.  Electronically Signed

## 2019-05-06 NOTE — PROGRESS NOTES
Subjective:      Isela Haney is a 28 y.o. female who presents with Pharyngitis (x3 days. Sore throat, fatigue, nausea, Rt ear pain.)            HPI  Sore throat, right ear pain, but no nasal congestion or facial pressure. No NSAID use. White spots on right tonsil. Intermittent salt water gargle. H/o recurrent tonsillitis. Possible exposure to strep by co-worker.    PMH:  has a past medical history of Abnormal Pap smear and cervical HPV (human papillomavirus) (10/12/2010) and Pregnant. She also has no past medical history of Asthma; Diabetes (HCC); Hemorrhagic disorder (HCC); Hypertension; or Seizure (HCC).  MEDS:   Current Outpatient Prescriptions:   •  etonogestrel (NEXPLANON) 68 MG Implant implant, Inject 1 Each as instructed Once., Disp: , Rfl:   •  ibuprofen (MOTRIN) 600 MG Tab, Take 1 Tab by mouth every 6 hours as needed (For cramping after delivery; do not give if patient is receiving ketorolac (Toradol))., Disp: 30 Tab, Rfl: 0  •  Prenatal Vit w/Mv-Vzzsntxtf-AW (PNV PO), Take 1 Tab by mouth., Disp: , Rfl:   ALLERGIES: No Known Allergies  SURGHX:   Past Surgical History:   Procedure Laterality Date   • PRIMARY C SECTION  3/28/2018    Procedure: PRIMARY C SECTION;  Surgeon: Kimmie Hinds M.D.;  Location: LABOR AND DELIVERY;  Service: Obstetrics     SOCHX:  reports that she has never smoked. She has never used smokeless tobacco. She reports that she does not drink alcohol or use drugs.  FH: Family history was reviewed, no pertinent findings to report    Review of Systems   Constitutional: Negative for chills, fever and malaise/fatigue.   HENT: Positive for ear pain and sore throat. Negative for congestion.    Eyes: Negative for discharge and redness.   Respiratory: Negative for cough, shortness of breath and wheezing.    Gastrointestinal: Negative for abdominal pain, constipation, diarrhea, nausea and vomiting.   Musculoskeletal: Negative for myalgias and neck pain.   Skin: Negative for itching and  "rash.   Neurological: Negative for dizziness, weakness and headaches.   Endo/Heme/Allergies: Negative for environmental allergies.   All other systems reviewed and are negative.         Objective:     /64   Pulse (!) 106   Temp 37.1 °C (98.7 °F)   Ht 1.626 m (5' 4\")   Wt 68 kg (150 lb)   SpO2 97%   BMI 25.75 kg/m²      Physical Exam   Constitutional: She is oriented to person, place, and time. Vital signs are normal. She appears well-developed and well-nourished. She is active and cooperative.  Non-toxic appearance. She does not have a sickly appearance. She does not appear ill. No distress.   HENT:   Head: Normocephalic.   Right Ear: Hearing, tympanic membrane, external ear and ear canal normal.   Nose: No mucosal edema, rhinorrhea or sinus tenderness.   Mouth/Throat: Uvula is midline. Mucous membranes are dry. Uvula swelling present. Oropharyngeal exudate, posterior oropharyngeal edema and posterior oropharyngeal erythema present. No tonsillar abscesses. Tonsils are 3+ on the right. Tonsils are 2+ on the left. Tonsillar exudate.   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   Eyes: Pupils are equal, round, and reactive to light. Conjunctivae and EOM are normal.   Neck: Normal range of motion. Neck supple.   Cardiovascular: Normal rate.    Pulmonary/Chest: Effort normal.   Musculoskeletal: Normal range of motion.   Neurological: She is alert and oriented to person, place, and time.   Skin: Skin is warm and dry. She is not diaphoretic.   Vitals reviewed.              Assessment/Plan:     1. Sore throat    - POCT Rapid Strep A: NEG  - lidocaine viscous 2% (XYLOCAINE) 2 % Solution; Take 15 mL by mouth 4 times a day as needed for Throat/Mouth Pain for up to 5 days.  Dispense: 300 mL; Refill: 0    2. Tonsillitis with exudate    - dexamethasone (DECADRON) injection 4 mg; 1 mL by Intramuscular route Once.  - amoxicillin-clavulanate (AUGMENTIN) 875-125 MG Tab; Take 1 Tab by mouth 2 times a day for 7 " days.  Dispense: 14 Tab; Refill: 0  - REFERRAL TO ENT    3. Recurrent tonsillitis    - REFERRAL TO ENT    Increase water intake  May use Ibuprofen/Tylenol prn for any fever, body aches or throat pain  May take long acting antihistamine for seasonal allergy symptoms prn  May use saline nasal spray/dyan pot for nasal congestion prn  May use Nasacort/Flonase prn for nasal congestion  May use throat lozenges for throat discomfort  May gargle with salt water prn for throat discomfort  May drink smoothies for nutrition if too painful to swallow solid foods  Monitor for any sinus pain/pressure with sinus congestion with thick mucus production and HA, cough, SOB, fever- need re-evaluation

## 2019-11-25 ENCOUNTER — OFFICE VISIT (OUTPATIENT)
Dept: URGENT CARE | Facility: PHYSICIAN GROUP | Age: 29
End: 2019-11-25
Payer: COMMERCIAL

## 2019-11-25 VITALS
BODY MASS INDEX: 27.31 KG/M2 | TEMPERATURE: 101 F | HEART RATE: 99 BPM | DIASTOLIC BLOOD PRESSURE: 70 MMHG | WEIGHT: 160 LBS | SYSTOLIC BLOOD PRESSURE: 102 MMHG | HEIGHT: 64 IN | OXYGEN SATURATION: 95 %

## 2019-11-25 DIAGNOSIS — J10.1 INFLUENZA B: ICD-10-CM

## 2019-11-25 PROCEDURE — 99214 OFFICE O/P EST MOD 30 MIN: CPT | Performed by: FAMILY MEDICINE

## 2019-11-25 NOTE — LETTER
November 25, 2019         Patient: Isela Haney   YOB: 1990   Date of Visit: 11/25/2019           To Whom it May Concern:    Isela Haney was seen in my clinic on 11/25/2019. She may return to work on Wednesday.    If you have any questions or concerns, please don't hesitate to call.        Sincerely,           Manuel Almazan M.D.  Electronically Signed

## 2019-11-26 NOTE — PROGRESS NOTES
"CC:  cough        Cough  This is a new problem. The current episode started 3 days ago. The problem has been unchanged. The problem occurs constantly. The cough is dry. Associated symptoms include : fatigue,   Headaches,  fever. Pertinent negatives include no   , nausea, vomiting, diarrhea, sweats, weight loss or wheezing. Nothing aggravates the symptoms.  Patient has tried nothing for the symptoms. There is no history of asthma.        Past Medical History:   Diagnosis Date   • Abnormal Pap smear and cervical HPV (human papillomavirus) 10/12/2010   • Pregnant          Social History     Tobacco Use   • Smoking status: Never Smoker   • Smokeless tobacco: Never Used   Substance Use Topics   • Alcohol use: No     Alcohol/week: 1.8 oz     Types: 3 Cans of beer per week   • Drug use: No         Current Outpatient Medications on File Prior to Visit   Medication Sig Dispense Refill   • ibuprofen (MOTRIN) 600 MG Tab Take 1 Tab by mouth every 6 hours as needed (For cramping after delivery; do not give if patient is receiving ketorolac (Toradol)). 30 Tab 0   • etonogestrel (NEXPLANON) 68 MG Implant implant Inject 1 Each as instructed Once.     • Prenatal Vit w/Cs-Weuyyzzay-NK (PNV PO) Take 1 Tab by mouth.       No current facility-administered medications on file prior to visit.                     Review of Systems   Constitutional: Negative for fever and weight loss.   HENT: negative for otalgia  Cardiovascular - denies chest pain or dyspnea  Respiratory: Positive for cough.  .  Negative for wheezing.    Neurological: + for headaches.   GI - denies nausea, vomiting or diarrhea  Neuro - denies numbness or tingling.            Objective:     /70   Pulse 99   Temp (!) 38.3 °C (101 °F) (Temporal)   Ht 1.626 m (5' 4\")   Wt 72.6 kg (160 lb)   SpO2 95%     Physical Exam   Constitutional: patient is oriented to person, place, and time. Patient appears well-developed and well-nourished. No distress.   HENT:   Head: " Normocephalic and atraumatic.   Right Ear: External ear normal.   Left Ear: External ear normal.   Nose: Mucosal edema  present. Right sinus exhibits no maxillary sinus tenderness. Left sinus exhibits no maxillary sinus tenderness.   Mouth/Throat: Mucous membranes are normal. No oral lesions.  No posterior pharyngeal erythema.  No oropharyngeal exudate or posterior oropharyngeal edema.   Eyes: Conjunctivae and EOM are normal. Pupils are equal, round, and reactive to light. Right eye exhibits no discharge. Left eye exhibits no discharge. No scleral icterus.   Neck: Normal range of motion. Neck supple. No tracheal deviation present.   Cardiovascular: Normal rate, regular rhythm and normal heart sounds.  Exam reveals no friction rub.    Pulmonary/Chest: Effort normal. No respiratory distress. Patient has no wheezes or rhonchi. Patient has no rales.    Musculoskeletal:  exhibits no edema.   Lymphadenopathy:     Patient has no cervical adenopathy.      Neurological: patient is alert and oriented to person, place, and time.   Skin: Skin is warm and dry. No rash noted. No erythema.   Psychiatric: patient  has a normal mood and affect.  behavior is normal.   Nursing note and vitals reviewed.              Assessment/Plan:       1. Influenza B  Assay positive for influenza B  She is outside tx window    rx motrin 800mg tid prn         Follow up in one week if no improvement

## 2020-03-20 ENCOUNTER — HOSPITAL ENCOUNTER (OUTPATIENT)
Dept: LAB | Facility: MEDICAL CENTER | Age: 30
End: 2020-03-20
Attending: NURSE PRACTITIONER
Payer: COMMERCIAL

## 2020-03-20 LAB
ALBUMIN SERPL BCP-MCNC: 4.2 G/DL (ref 3.2–4.9)
ALBUMIN/GLOB SERPL: 1.4 G/DL
ALP SERPL-CCNC: 109 U/L (ref 30–99)
ALT SERPL-CCNC: 20 U/L (ref 2–50)
AMYLASE SERPL-CCNC: 51 U/L (ref 20–103)
ANION GAP SERPL CALC-SCNC: 9 MMOL/L (ref 7–16)
AST SERPL-CCNC: 18 U/L (ref 12–45)
BASOPHILS # BLD AUTO: 0.4 % (ref 0–1.8)
BASOPHILS # BLD: 0.03 K/UL (ref 0–0.12)
BILIRUB SERPL-MCNC: 0.3 MG/DL (ref 0.1–1.5)
BUN SERPL-MCNC: 11 MG/DL (ref 8–22)
CALCIUM SERPL-MCNC: 8.9 MG/DL (ref 8.5–10.5)
CHLORIDE SERPL-SCNC: 106 MMOL/L (ref 96–112)
CHOLEST SERPL-MCNC: 148 MG/DL (ref 100–199)
CO2 SERPL-SCNC: 24 MMOL/L (ref 20–33)
CREAT SERPL-MCNC: 0.67 MG/DL (ref 0.5–1.4)
EOSINOPHIL # BLD AUTO: 0.07 K/UL (ref 0–0.51)
EOSINOPHIL NFR BLD: 1 % (ref 0–6.9)
ERYTHROCYTE [DISTWIDTH] IN BLOOD BY AUTOMATED COUNT: 44.4 FL (ref 35.9–50)
FASTING STATUS PATIENT QL REPORTED: NORMAL
FOLATE SERPL-MCNC: 12.2 NG/ML
GLOBULIN SER CALC-MCNC: 3.1 G/DL (ref 1.9–3.5)
GLUCOSE SERPL-MCNC: 80 MG/DL (ref 65–99)
HCT VFR BLD AUTO: 45.9 % (ref 37–47)
HDLC SERPL-MCNC: 40 MG/DL
HGB BLD-MCNC: 14.6 G/DL (ref 12–16)
IMM GRANULOCYTES # BLD AUTO: 0.02 K/UL (ref 0–0.11)
IMM GRANULOCYTES NFR BLD AUTO: 0.3 % (ref 0–0.9)
LDLC SERPL CALC-MCNC: 88 MG/DL
LIPASE SERPL-CCNC: 30 U/L (ref 11–82)
LYMPHOCYTES # BLD AUTO: 2.2 K/UL (ref 1–4.8)
LYMPHOCYTES NFR BLD: 32.1 % (ref 22–41)
MCH RBC QN AUTO: 30.5 PG (ref 27–33)
MCHC RBC AUTO-ENTMCNC: 31.8 G/DL (ref 33.6–35)
MCV RBC AUTO: 95.8 FL (ref 81.4–97.8)
MONOCYTES # BLD AUTO: 0.72 K/UL (ref 0–0.85)
MONOCYTES NFR BLD AUTO: 10.5 % (ref 0–13.4)
NEUTROPHILS # BLD AUTO: 3.82 K/UL (ref 2–7.15)
NEUTROPHILS NFR BLD: 55.7 % (ref 44–72)
NRBC # BLD AUTO: 0 K/UL
NRBC BLD-RTO: 0 /100 WBC
PLATELET # BLD AUTO: 239 K/UL (ref 164–446)
PMV BLD AUTO: 11.9 FL (ref 9–12.9)
POTASSIUM SERPL-SCNC: 4.5 MMOL/L (ref 3.6–5.5)
PROT SERPL-MCNC: 7.3 G/DL (ref 6–8.2)
RBC # BLD AUTO: 4.79 M/UL (ref 4.2–5.4)
SODIUM SERPL-SCNC: 139 MMOL/L (ref 135–145)
T3FREE SERPL-MCNC: 3.18 PG/ML (ref 2.4–4.2)
T4 FREE SERPL-MCNC: 1.09 NG/DL (ref 0.53–1.43)
TRIGL SERPL-MCNC: 99 MG/DL (ref 0–149)
TSH SERPL DL<=0.005 MIU/L-ACNC: 2.5 UIU/ML (ref 0.38–5.33)
VIT B12 SERPL-MCNC: 451 PG/ML (ref 211–911)
WBC # BLD AUTO: 6.9 K/UL (ref 4.8–10.8)

## 2020-03-20 PROCEDURE — 83690 ASSAY OF LIPASE: CPT

## 2020-03-20 PROCEDURE — 82607 VITAMIN B-12: CPT

## 2020-03-20 PROCEDURE — 36415 COLL VENOUS BLD VENIPUNCTURE: CPT

## 2020-03-20 PROCEDURE — 84481 FREE ASSAY (FT-3): CPT

## 2020-03-20 PROCEDURE — 84443 ASSAY THYROID STIM HORMONE: CPT

## 2020-03-20 PROCEDURE — 82150 ASSAY OF AMYLASE: CPT

## 2020-03-20 PROCEDURE — 80061 LIPID PANEL: CPT

## 2020-03-20 PROCEDURE — 82746 ASSAY OF FOLIC ACID SERUM: CPT

## 2020-03-20 PROCEDURE — 86800 THYROGLOBULIN ANTIBODY: CPT

## 2020-03-20 PROCEDURE — 85025 COMPLETE CBC W/AUTO DIFF WBC: CPT

## 2020-03-20 PROCEDURE — 80053 COMPREHEN METABOLIC PANEL: CPT

## 2020-03-20 PROCEDURE — 84439 ASSAY OF FREE THYROXINE: CPT

## 2020-03-21 LAB — THYROGLOB AB SERPL-ACNC: <0.9 IU/ML (ref 0–4)

## 2020-03-31 ENCOUNTER — HOSPITAL ENCOUNTER (OUTPATIENT)
Dept: RADIOLOGY | Facility: MEDICAL CENTER | Age: 30
End: 2020-03-31
Attending: NURSE PRACTITIONER
Payer: COMMERCIAL

## 2020-03-31 DIAGNOSIS — R10.12 LEFT UPPER QUADRANT PAIN: ICD-10-CM

## 2020-03-31 PROCEDURE — 76700 US EXAM ABDOM COMPLETE: CPT

## 2021-01-19 ENCOUNTER — APPOINTMENT (OUTPATIENT)
Dept: URGENT CARE | Facility: PHYSICIAN GROUP | Age: 31
End: 2021-01-19
Payer: COMMERCIAL

## 2021-08-16 ENCOUNTER — OFFICE VISIT (OUTPATIENT)
Dept: URGENT CARE | Facility: PHYSICIAN GROUP | Age: 31
End: 2021-08-16
Payer: COMMERCIAL

## 2021-08-16 VITALS
DIASTOLIC BLOOD PRESSURE: 52 MMHG | OXYGEN SATURATION: 100 % | BODY MASS INDEX: 27.31 KG/M2 | SYSTOLIC BLOOD PRESSURE: 112 MMHG | HEIGHT: 64 IN | WEIGHT: 160 LBS | HEART RATE: 87 BPM | RESPIRATION RATE: 18 BRPM | TEMPERATURE: 98.3 F

## 2021-08-16 DIAGNOSIS — J02.0 STREP PHARYNGITIS: ICD-10-CM

## 2021-08-16 LAB
INT CON NEG: NEGATIVE
INT CON POS: POSITIVE
S PYO AG THROAT QL: NORMAL

## 2021-08-16 PROCEDURE — 87880 STREP A ASSAY W/OPTIC: CPT | Performed by: PHYSICIAN ASSISTANT

## 2021-08-16 PROCEDURE — 99213 OFFICE O/P EST LOW 20 MIN: CPT | Performed by: PHYSICIAN ASSISTANT

## 2021-08-16 RX ORDER — AMOXICILLIN 500 MG/1
500 CAPSULE ORAL 2 TIMES DAILY
Qty: 20 CAPSULE | Refills: 0 | Status: SHIPPED | OUTPATIENT
Start: 2021-08-16 | End: 2021-08-26

## 2021-08-16 ASSESSMENT — ENCOUNTER SYMPTOMS
EYE REDNESS: 0
COUGH: 0
WHEEZING: 0
FEVER: 0
EYE DISCHARGE: 0
HEADACHES: 0
EYE PAIN: 0
PALPITATIONS: 0
SORE THROAT: 1
SPUTUM PRODUCTION: 0
SHORTNESS OF BREATH: 0
STRIDOR: 0
CHILLS: 0
HEMOPTYSIS: 0

## 2021-08-16 ASSESSMENT — FIBROSIS 4 INDEX: FIB4 SCORE: 0.47

## 2021-08-16 NOTE — LETTER
August 16, 2021         Patient: Isela Haney   YOB: 1990   Date of Visit: 8/16/2021           To Whom it May Concern:    Isela Haney was seen in my clinic on 8/16/2021. Please excuse her from work on this day for strept throat.    If you have any questions or concerns, please don't hesitate to call.        Sincerely,           Casey Jewell P.A.-C.  Electronically Signed

## 2021-08-16 NOTE — PROGRESS NOTES
Subjective:   Isela Haney is a 30 y.o. female who presents for Pharyngitis (x1 day )      Pharyngitis   This is a new problem. The current episode started today. The problem has been unchanged. There has been no fever. Associated symptoms include ear pain. Pertinent negatives include no congestion, coughing, ear discharge, headaches, shortness of breath or stridor. She has tried nothing for the symptoms.       Review of Systems   Constitutional: Positive for malaise/fatigue. Negative for chills and fever.   HENT: Positive for ear pain and sore throat. Negative for congestion and ear discharge.    Eyes: Negative for pain, discharge and redness.   Respiratory: Negative for cough, hemoptysis, sputum production, shortness of breath, wheezing and stridor.    Cardiovascular: Negative for chest pain and palpitations.   Skin: Negative for itching and rash.   Neurological: Negative for headaches.   All other systems reviewed and are negative.      Medications:    • etonogestrel Impl  • ibuprofen Tabs  • PNV PO    Allergies: Patient has no known allergies.    Problem List: Isela Haney does not have any pertinent problems on file.    Surgical History:  Past Surgical History:   Procedure Laterality Date   • PRIMARY C SECTION  3/28/2018    Procedure: PRIMARY C SECTION;  Surgeon: Kimmie Hinds M.D.;  Location: LABOR AND DELIVERY;  Service: Obstetrics       Past Social Hx: Isela Haney  reports that she has never smoked. She has never used smokeless tobacco. She reports that she does not drink alcohol and does not use drugs.     Past Family Hx:  Isela Haney family history includes Cancer in her paternal grandfather; Diabetes in her maternal grandfather, maternal grandmother, paternal grandfather, and paternal grandmother.     Problem list, medications, and allergies reviewed by myself today in Epic.     Objective:     Blood Pressure 112/52   Pulse 87   Temperature 36.8 °C (98.3 °F) (Temporal)   " Respiration 18   Height 1.626 m (5' 4\")   Weight 72.6 kg (160 lb)   Oxygen Saturation 100%   Body Mass Index 27.46 kg/m²     Physical Exam  Vitals reviewed.   Constitutional:       General: She is not in acute distress.     Appearance: She is well-developed. She is not ill-appearing or toxic-appearing.      Interventions: She is not intubated.  HENT:      Head: Normocephalic and atraumatic.      Right Ear: Hearing, tympanic membrane, ear canal and external ear normal.      Left Ear: Hearing, tympanic membrane, ear canal and external ear normal.      Nose: Nose normal.      Mouth/Throat:      Pharynx: Uvula midline.   Eyes:      General: Lids are normal.      Conjunctiva/sclera: Conjunctivae normal.   Cardiovascular:      Rate and Rhythm: Regular rhythm.      Heart sounds: Normal heart sounds, S1 normal and S2 normal. No murmur heard.   No friction rub. No gallop.    Pulmonary:      Effort: Pulmonary effort is normal. No tachypnea, bradypnea, accessory muscle usage or respiratory distress. She is not intubated.      Breath sounds: Normal breath sounds. No decreased breath sounds, wheezing, rhonchi or rales.   Chest:      Chest wall: No tenderness.   Musculoskeletal:         General: Normal range of motion.      Cervical back: Full passive range of motion without pain, normal range of motion and neck supple.   Skin:     General: Skin is warm and dry.   Neurological:      Mental Status: She is alert and oriented to person, place, and time.   Psychiatric:         Speech: Speech normal.         Behavior: Behavior normal.         Thought Content: Thought content normal.         Judgment: Judgment normal.         Assessment/Plan:     Medical Decision Making/Comments     Pt is a 30 yr old female who presents for evaluation of a sore throat.  Pt states having symptoms for 1 days with other URI symptoms.  Pt denies red flag symptoms of stiff neck or unable to handle oral secretions. Pt is up to date with vaccinations.  " Vital signs normal.  Pt appears well and non-toxic.  Exam shows erythema of the posterior pharynx with clear airway.  Pt has soft and supple neck with full ROM.  There is no tender cervical lymphadenopathy, muffled voice, trismus, posterior oral pharyngeal swelling or uvula deviation. Based from history, symptoms, and exam epiglottitis, retropharyngeal abscess, peritonsillar abscess, diphtheria are unlikely.  Rapid Strep is POS     Diagnosis differential includes but not limited to: bacterial pharyngitis, viral pharyngitis, stomatitis, candida albicans.          Diagnosis and associated orders     1. Sore throat  POCT Rapid Strep A    amoxicillin (AMOXIL) 500 MG Cap       - Warm salt water gargles  - NSAIDs/Acetamenopohen PRN  - Throat lozenges PRN  - Cool mist humidifier  - Increase hydration/solid diet as tolerated               Differential diagnosis, natural history, supportive care, and indications for immediate follow-up discussed.    Advised the patient to follow-up with the primary care physician for recheck, reevaluation, and consideration of further management.    Please note that this dictation was created using voice recognition software. I have made a reasonable attempt to correct obvious errors, but I expect that there are errors of grammar and possibly content that I did not discover before finalizing the note.

## 2022-10-14 ENCOUNTER — OFFICE VISIT (OUTPATIENT)
Dept: URGENT CARE | Facility: PHYSICIAN GROUP | Age: 32
End: 2022-10-14
Payer: COMMERCIAL

## 2022-10-14 VITALS
BODY MASS INDEX: 28 KG/M2 | OXYGEN SATURATION: 96 % | WEIGHT: 164 LBS | RESPIRATION RATE: 16 BRPM | SYSTOLIC BLOOD PRESSURE: 112 MMHG | DIASTOLIC BLOOD PRESSURE: 66 MMHG | HEIGHT: 64 IN | TEMPERATURE: 98.7 F | HEART RATE: 85 BPM

## 2022-10-14 DIAGNOSIS — J02.9 SORE THROAT: ICD-10-CM

## 2022-10-14 DIAGNOSIS — J02.0 STREP PHARYNGITIS: ICD-10-CM

## 2022-10-14 LAB
INT CON NEG: NORMAL
INT CON POS: NORMAL
S PYO AG THROAT QL: POSITIVE

## 2022-10-14 PROCEDURE — 87880 STREP A ASSAY W/OPTIC: CPT | Performed by: PHYSICIAN ASSISTANT

## 2022-10-14 PROCEDURE — 99213 OFFICE O/P EST LOW 20 MIN: CPT | Performed by: PHYSICIAN ASSISTANT

## 2022-10-14 RX ORDER — DEXAMETHASONE SODIUM PHOSPHATE 10 MG/ML
10 INJECTION INTRAMUSCULAR; INTRAVENOUS ONCE
Status: COMPLETED | OUTPATIENT
Start: 2022-10-14 | End: 2022-10-14

## 2022-10-14 RX ORDER — AMOXICILLIN 500 MG/1
500 CAPSULE ORAL 2 TIMES DAILY
Qty: 20 CAPSULE | Refills: 0 | Status: SHIPPED | OUTPATIENT
Start: 2022-10-14 | End: 2022-10-24

## 2022-10-14 RX ORDER — COPPER 313.4 MG/1
INTRAUTERINE DEVICE INTRAUTERINE
COMMUNITY
End: 2023-05-18

## 2022-10-14 RX ADMIN — DEXAMETHASONE SODIUM PHOSPHATE 10 MG: 10 INJECTION INTRAMUSCULAR; INTRAVENOUS at 10:25

## 2022-10-14 NOTE — PROGRESS NOTES
"Subjective:   Isela Haney is a 32 y.o. female who presents for Sore Throat (Sore throat for 3 days, with body aches, chills and fever. )     Patient presents with 3-day history of severe sore throat associated with fever, myalgias, and painful swallowing.  She denies cough.  T-max 103.  No known sick contacts.    She has tried Tylenol and ibuprofen including this morning.  She is not having difficulty managing her secretions.  She is able to tolerate liquids.    Medications:  etonogestrel Impl  ibuprofen Tabs  PNV PO    Allergies:             Patient has no known allergies.    Surgical History:         Past Surgical History:   Procedure Laterality Date    PRIMARY C SECTION  3/28/2018    Procedure: PRIMARY C SECTION;  Surgeon: Kimmie Hinds M.D.;  Location: LABOR AND DELIVERY;  Service: Obstetrics       Past Social Hx:  Isela Haney  reports that she has never smoked. She has never used smokeless tobacco. She reports that she does not drink alcohol and does not use drugs.     Past Family Hx:   Isela Haney family history includes Cancer in her paternal grandfather; Diabetes in her maternal grandfather, maternal grandmother, paternal grandfather, and paternal grandmother.       Problem list, medications, and allergies reviewed by myself today in Epic.     Objective:     /66   Pulse 85   Temp 37.1 °C (98.7 °F) (Temporal)   Resp 16   Ht 1.626 m (5' 4\")   Wt 74.4 kg (164 lb)   SpO2 96%   BMI 28.15 kg/m²     Physical Exam  Vitals and nursing note reviewed.   Constitutional:       Appearance: Normal appearance. She is ill-appearing.   HENT:      Right Ear: Tympanic membrane and external ear normal.      Left Ear: Tympanic membrane and external ear normal.      Nose: Nose normal.      Mouth/Throat:      Lips: Pink.      Mouth: Mucous membranes are moist. No oral lesions or angioedema.      Palate: No lesions.      Pharynx: Uvula midline. Oropharyngeal exudate and posterior " oropharyngeal erythema present. No uvula swelling.      Tonsils: Tonsillar exudate present. No tonsillar abscesses.      Comments: No evidence of peritonsillar abscess  Eyes:      Conjunctiva/sclera: Conjunctivae normal.   Cardiovascular:      Rate and Rhythm: Normal rate and regular rhythm.      Pulses: Normal pulses.      Heart sounds: Normal heart sounds.   Pulmonary:      Effort: Pulmonary effort is normal. No respiratory distress.      Breath sounds: Normal breath sounds. No stridor. No wheezing or rhonchi.   Abdominal:      Tenderness: There is no abdominal tenderness.   Musculoskeletal:      Cervical back: No rigidity.   Lymphadenopathy:      Cervical: Cervical adenopathy present.   Skin:     Findings: No rash.   Neurological:      Mental Status: She is alert.     Rapid strep positive  Assessment/Plan:     Diagnosis and Associated Orders:     1. Sore throat  - POCT Rapid Strep A  - amoxicillin (AMOXIL) 500 MG Cap; Take 1 Capsule by mouth 2 times a day for 10 days.  Dispense: 20 Capsule; Refill: 0  - dexamethasone (DECADRON) injection (check route below) 10 mg  - Referral to establish with [facility] PCP    2. Strep pharyngitis  - amoxicillin (AMOXIL) 500 MG Cap; Take 1 Capsule by mouth 2 times a day for 10 days.  Dispense: 20 Capsule; Refill: 0  - dexamethasone (DECADRON) injection (check route below) 10 mg  - Referral to establish with [facility] PCP    Other orders  - Paragard Intrauterine Copper IUD; by Intrauterine route.      Comments/MDM:    POSITIVE Strep A.  Discussed antibiotic treatment for full 10 days, salt water gargles, soft foods, cool liquids, ibuprofen and Tylenol for any pain or fevers. Switch out your toothbrush for a new toothbrush in 2-3 days time.  You will be contagious for 24 hours after initiation of antibiotis.   Return to the urgent care if symptoms are not improving or any worsening symptoms or concerns. Present to the emergency room or call 911 if any severe swelling of the throat,  difficulty swallowing, difficulty breathing, wheezing, or any other severe concerns.       I personally reviewed prior external notes and test results pertinent to today's visit.  Red flags discussed as well as indications to present to the Emergency Department.  Supportive care, natural history, differential diagnoses, and indications for immediate follow-up discussed.  Patient expresses understanding and agrees to plan.  Patient denies any other questions or concerns.    Follow-up with the primary care physician for recheck, reevaluation, and consideration of further management.      Please note that this dictation was created using voice recognition software. I have made a reasonable attempt to correct obvious errors, but I expect that there are errors of grammar and possibly content that I did not discover before finalizing the note.    This note was electronically signed by Mindy Blackmon PA-C

## 2022-10-14 NOTE — LETTER
October 14, 2022    To Whom It May Concern:         This is confirmation that Isela RossuirreAbrahamRadha attended her scheduled appointment with Mindy Blackmon P.A.-C. on 10/14/22.  Please excuse patient from work 10/12-10/14 due to streptococcal pharyngitis.         If you have any questions please do not hesitate to call me at the phone number listed below.    Sincerely,          Mindy Blackmon P.A.-C.  [phone]

## 2022-10-15 ENCOUNTER — TELEPHONE (OUTPATIENT)
Dept: SCHEDULING | Facility: IMAGING CENTER | Age: 32
End: 2022-10-15

## 2022-11-14 SDOH — ECONOMIC STABILITY: INCOME INSECURITY: HOW HARD IS IT FOR YOU TO PAY FOR THE VERY BASICS LIKE FOOD, HOUSING, MEDICAL CARE, AND HEATING?: NOT VERY HARD

## 2022-11-14 SDOH — HEALTH STABILITY: PHYSICAL HEALTH: ON AVERAGE, HOW MANY MINUTES DO YOU ENGAGE IN EXERCISE AT THIS LEVEL?: 0 MIN

## 2022-11-14 SDOH — ECONOMIC STABILITY: HOUSING INSECURITY
IN THE LAST 12 MONTHS, WAS THERE A TIME WHEN YOU DID NOT HAVE A STEADY PLACE TO SLEEP OR SLEPT IN A SHELTER (INCLUDING NOW)?: NO

## 2022-11-14 SDOH — ECONOMIC STABILITY: TRANSPORTATION INSECURITY
IN THE PAST 12 MONTHS, HAS LACK OF RELIABLE TRANSPORTATION KEPT YOU FROM MEDICAL APPOINTMENTS, MEETINGS, WORK OR FROM GETTING THINGS NEEDED FOR DAILY LIVING?: NO

## 2022-11-14 SDOH — ECONOMIC STABILITY: FOOD INSECURITY: WITHIN THE PAST 12 MONTHS, YOU WORRIED THAT YOUR FOOD WOULD RUN OUT BEFORE YOU GOT MONEY TO BUY MORE.: NEVER TRUE

## 2022-11-14 SDOH — HEALTH STABILITY: PHYSICAL HEALTH: ON AVERAGE, HOW MANY DAYS PER WEEK DO YOU ENGAGE IN MODERATE TO STRENUOUS EXERCISE (LIKE A BRISK WALK)?: 0 DAYS

## 2022-11-14 SDOH — ECONOMIC STABILITY: FOOD INSECURITY: WITHIN THE PAST 12 MONTHS, THE FOOD YOU BOUGHT JUST DIDN'T LAST AND YOU DIDN'T HAVE MONEY TO GET MORE.: NEVER TRUE

## 2022-11-14 SDOH — ECONOMIC STABILITY: INCOME INSECURITY: IN THE LAST 12 MONTHS, WAS THERE A TIME WHEN YOU WERE NOT ABLE TO PAY THE MORTGAGE OR RENT ON TIME?: NO

## 2022-11-14 SDOH — ECONOMIC STABILITY: HOUSING INSECURITY

## 2022-11-14 SDOH — HEALTH STABILITY: MENTAL HEALTH
STRESS IS WHEN SOMEONE FEELS TENSE, NERVOUS, ANXIOUS, OR CAN'T SLEEP AT NIGHT BECAUSE THEIR MIND IS TROUBLED. HOW STRESSED ARE YOU?: TO SOME EXTENT

## 2022-11-14 SDOH — ECONOMIC STABILITY: TRANSPORTATION INSECURITY
IN THE PAST 12 MONTHS, HAS THE LACK OF TRANSPORTATION KEPT YOU FROM MEDICAL APPOINTMENTS OR FROM GETTING MEDICATIONS?: NO

## 2022-11-14 SDOH — ECONOMIC STABILITY: TRANSPORTATION INSECURITY
IN THE PAST 12 MONTHS, HAS LACK OF TRANSPORTATION KEPT YOU FROM MEETINGS, WORK, OR FROM GETTING THINGS NEEDED FOR DAILY LIVING?: NO

## 2022-11-14 ASSESSMENT — LIFESTYLE VARIABLES
HOW OFTEN DO YOU HAVE A DRINK CONTAINING ALCOHOL: 2-3 TIMES A WEEK
AUDIT-C TOTAL SCORE: 6
SKIP TO QUESTIONS 9-10: 0
HOW OFTEN DO YOU HAVE SIX OR MORE DRINKS ON ONE OCCASION: MONTHLY
HOW MANY STANDARD DRINKS CONTAINING ALCOHOL DO YOU HAVE ON A TYPICAL DAY: 3 OR 4

## 2022-11-14 ASSESSMENT — SOCIAL DETERMINANTS OF HEALTH (SDOH)
HOW OFTEN DO YOU ATTENT MEETINGS OF THE CLUB OR ORGANIZATION YOU BELONG TO?: PATIENT DECLINED
HOW OFTEN DO YOU ATTEND CHURCH OR RELIGIOUS SERVICES?: NEVER
IN A TYPICAL WEEK, HOW MANY TIMES DO YOU TALK ON THE PHONE WITH FAMILY, FRIENDS, OR NEIGHBORS?: TWICE A WEEK
HOW OFTEN DO YOU GET TOGETHER WITH FRIENDS OR RELATIVES?: ONCE A WEEK
HOW HARD IS IT FOR YOU TO PAY FOR THE VERY BASICS LIKE FOOD, HOUSING, MEDICAL CARE, AND HEATING?: NOT VERY HARD
HOW MANY DRINKS CONTAINING ALCOHOL DO YOU HAVE ON A TYPICAL DAY WHEN YOU ARE DRINKING: 3 OR 4
DO YOU BELONG TO ANY CLUBS OR ORGANIZATIONS SUCH AS CHURCH GROUPS UNIONS, FRATERNAL OR ATHLETIC GROUPS, OR SCHOOL GROUPS?: NO
WITHIN THE PAST 12 MONTHS, YOU WORRIED THAT YOUR FOOD WOULD RUN OUT BEFORE YOU GOT THE MONEY TO BUY MORE: NEVER TRUE
IN A TYPICAL WEEK, HOW MANY TIMES DO YOU TALK ON THE PHONE WITH FAMILY, FRIENDS, OR NEIGHBORS?: TWICE A WEEK
HOW OFTEN DO YOU ATTENT MEETINGS OF THE CLUB OR ORGANIZATION YOU BELONG TO?: PATIENT DECLINED
HOW OFTEN DO YOU ATTEND CHURCH OR RELIGIOUS SERVICES?: NEVER
DO YOU BELONG TO ANY CLUBS OR ORGANIZATIONS SUCH AS CHURCH GROUPS UNIONS, FRATERNAL OR ATHLETIC GROUPS, OR SCHOOL GROUPS?: NO
HOW OFTEN DO YOU HAVE A DRINK CONTAINING ALCOHOL: 2-3 TIMES A WEEK
HOW OFTEN DO YOU HAVE SIX OR MORE DRINKS ON ONE OCCASION: MONTHLY
HOW OFTEN DO YOU GET TOGETHER WITH FRIENDS OR RELATIVES?: ONCE A WEEK

## 2022-11-17 ENCOUNTER — OFFICE VISIT (OUTPATIENT)
Dept: MEDICAL GROUP | Facility: PHYSICIAN GROUP | Age: 32
End: 2022-11-17
Payer: COMMERCIAL

## 2022-11-17 ENCOUNTER — HOSPITAL ENCOUNTER (OUTPATIENT)
Dept: LAB | Facility: MEDICAL CENTER | Age: 32
End: 2022-11-17
Attending: NURSE PRACTITIONER
Payer: COMMERCIAL

## 2022-11-17 VITALS
WEIGHT: 172 LBS | SYSTOLIC BLOOD PRESSURE: 124 MMHG | TEMPERATURE: 97.6 F | RESPIRATION RATE: 16 BRPM | BODY MASS INDEX: 29.37 KG/M2 | HEART RATE: 73 BPM | DIASTOLIC BLOOD PRESSURE: 70 MMHG | HEIGHT: 64 IN | OXYGEN SATURATION: 98 %

## 2022-11-17 DIAGNOSIS — Z76.89 ENCOUNTER TO ESTABLISH CARE: ICD-10-CM

## 2022-11-17 DIAGNOSIS — Z13.228 SCREENING FOR METABOLIC DISORDER: ICD-10-CM

## 2022-11-17 DIAGNOSIS — R19.00 ABDOMINAL WALL BULGE: ICD-10-CM

## 2022-11-17 DIAGNOSIS — M25.562 CHRONIC PAIN OF LEFT KNEE: ICD-10-CM

## 2022-11-17 DIAGNOSIS — G89.29 CHRONIC PAIN OF LEFT KNEE: ICD-10-CM

## 2022-11-17 PROBLEM — Z86.59 HISTORY OF DEPRESSION: Status: ACTIVE | Noted: 2022-11-17

## 2022-11-17 PROBLEM — Z98.891 STATUS POST PRIMARY LOW TRANSVERSE CESAREAN SECTION: Status: RESOLVED | Noted: 2018-03-30 | Resolved: 2022-11-17

## 2022-11-17 PROBLEM — G89.18 ACUTE POST-OPERATIVE PAIN: Status: RESOLVED | Noted: 2018-03-30 | Resolved: 2022-11-17

## 2022-11-17 LAB
ALBUMIN SERPL BCP-MCNC: 4.6 G/DL (ref 3.2–4.9)
ALBUMIN/GLOB SERPL: 1.4 G/DL
ALP SERPL-CCNC: 103 U/L (ref 30–99)
ALT SERPL-CCNC: 30 U/L (ref 2–50)
ANION GAP SERPL CALC-SCNC: 12 MMOL/L (ref 7–16)
AST SERPL-CCNC: 24 U/L (ref 12–45)
BILIRUB SERPL-MCNC: 0.5 MG/DL (ref 0.1–1.5)
BUN SERPL-MCNC: 11 MG/DL (ref 8–22)
CALCIUM SERPL-MCNC: 9.5 MG/DL (ref 8.5–10.5)
CHLORIDE SERPL-SCNC: 104 MMOL/L (ref 96–112)
CHOLEST SERPL-MCNC: 224 MG/DL (ref 100–199)
CO2 SERPL-SCNC: 24 MMOL/L (ref 20–33)
CREAT SERPL-MCNC: 0.65 MG/DL (ref 0.5–1.4)
ERYTHROCYTE [DISTWIDTH] IN BLOOD BY AUTOMATED COUNT: 44.1 FL (ref 35.9–50)
FASTING STATUS PATIENT QL REPORTED: NORMAL
GFR SERPLBLD CREATININE-BSD FMLA CKD-EPI: 121 ML/MIN/1.73 M 2
GLOBULIN SER CALC-MCNC: 3.3 G/DL (ref 1.9–3.5)
GLUCOSE SERPL-MCNC: 65 MG/DL (ref 65–99)
HCT VFR BLD AUTO: 45.1 % (ref 37–47)
HDLC SERPL-MCNC: 55 MG/DL
HGB BLD-MCNC: 14.4 G/DL (ref 12–16)
LDLC SERPL CALC-MCNC: 138 MG/DL
MCH RBC QN AUTO: 30.2 PG (ref 27–33)
MCHC RBC AUTO-ENTMCNC: 31.9 G/DL (ref 33.6–35)
MCV RBC AUTO: 94.5 FL (ref 81.4–97.8)
PLATELET # BLD AUTO: 352 K/UL (ref 164–446)
PMV BLD AUTO: 11.3 FL (ref 9–12.9)
POTASSIUM SERPL-SCNC: 4.3 MMOL/L (ref 3.6–5.5)
PROT SERPL-MCNC: 7.9 G/DL (ref 6–8.2)
RBC # BLD AUTO: 4.77 M/UL (ref 4.2–5.4)
SODIUM SERPL-SCNC: 140 MMOL/L (ref 135–145)
TRIGL SERPL-MCNC: 155 MG/DL (ref 0–149)
TSH SERPL DL<=0.005 MIU/L-ACNC: 1.44 UIU/ML (ref 0.38–5.33)
WBC # BLD AUTO: 7.7 K/UL (ref 4.8–10.8)

## 2022-11-17 PROCEDURE — 99214 OFFICE O/P EST MOD 30 MIN: CPT | Performed by: NURSE PRACTITIONER

## 2022-11-17 PROCEDURE — 84443 ASSAY THYROID STIM HORMONE: CPT

## 2022-11-17 PROCEDURE — 80061 LIPID PANEL: CPT

## 2022-11-17 PROCEDURE — 36415 COLL VENOUS BLD VENIPUNCTURE: CPT

## 2022-11-17 PROCEDURE — 80053 COMPREHEN METABOLIC PANEL: CPT

## 2022-11-17 PROCEDURE — 85027 COMPLETE CBC AUTOMATED: CPT

## 2022-11-17 ASSESSMENT — PATIENT HEALTH QUESTIONNAIRE - PHQ9
SUM OF ALL RESPONSES TO PHQ QUESTIONS 1-9: 6
CLINICAL INTERPRETATION OF PHQ2 SCORE: 1
5. POOR APPETITE OR OVEREATING: 2 - MORE THAN HALF THE DAYS

## 2022-11-17 ASSESSMENT — ENCOUNTER SYMPTOMS
SHORTNESS OF BREATH: 0
COUGH: 0
CONSTITUTIONAL NEGATIVE: 1
EYES NEGATIVE: 1
NEUROLOGICAL NEGATIVE: 1
MUSCULOSKELETAL NEGATIVE: 1
FEVER: 0
SPUTUM PRODUCTION: 0
PSYCHIATRIC NEGATIVE: 1
GASTROINTESTINAL NEGATIVE: 1
PALPITATIONS: 0

## 2022-11-17 NOTE — PROGRESS NOTES
Subjective:     CC:    Chief Complaint   Patient presents with    Establish Care     Wants to discuss pain right above belly button x2-3 years .         HISTORY OF THE PRESENT ILLNESS: Patient is a 32 y.o. female, here today to establish care. Prior PCP was SHERIF Pearson. She has a past history of depression, in remission currently and not needing medication. Active concern today is left knee discomfort & bulging above her belly button.  The below problems were discussed/reviewed at this visit:    Problem   Abdominal Wall Bulge    States the past 3 years she has noticed intermittent bulge above belly button about 1-2x per month. Sometimes bulge remains for a week before it reduces on its own. Feels more bloating & mild discomfort when her pants push around her belly button. Otherwise she does not have pain.  - US to check for hernia  - consider surgical referral once imaging results are reviewed     Chronic Pain of Left Knee     for over 6 years. Left knee for the past year or so has been tender if she keeps it extended for too long; sometimes has mild discomfort with working. Concern for arthritis vs over use tendonitis  - xray ordered today   - consider PT once imaging results are reviewed     History of Depression    Was on Prozac short term back in 2019; States only took medication for less than a year. Doing okay. Regular life stress which she is managing well  No SI/self harm reported today     Labor and Delivery, Indication for Care (Resolved)   Status Post Primary Low Transverse  Section (Resolved)   Acute Post-Operative Pain (Resolved)       Patient Active Problem List   Diagnosis    Abdominal wall bulge    Chronic pain of left knee    History of depression       Past Medical History:   Diagnosis Date    Abnormal Pap smear and cervical HPV (human papillomavirus) 10/12/2010    Pregnant         Current Outpatient Medications Ordered in Epic   Medication Sig Dispense Refill    [facility]  "Intrauterine Copper IUD by Intrauterine route.      etonogestrel (NEXPLANON) 68 MG Implant implant Inject 1 Each as instructed Once. (Patient not taking: Reported on 10/14/2022)      ibuprofen (MOTRIN) 600 MG Tab Take 1 Tab by mouth every 6 hours as needed (For cramping after delivery; do not give if patient is receiving ketorolac (Toradol)). (Patient not taking: Reported on 2021) 30 Tab 0    Prenatal Vit w/Jw-Nnvhwifeg-TA (PNV PO) Take 1 Tab by mouth. (Patient not taking: Reported on 2021)       No current [facility]-ordered facility-administered medications on file.        Past Surgical History:   Procedure Laterality Date    PRIMARY C SECTION  3/28/2018    Procedure: PRIMARY C SECTION;  Surgeon: Kimmie Hinds M.D.;  Location: LABOR AND DELIVERY;  Service: Obstetrics        Allergies:  Patient has no known allergies.    Health Maintenance: Completed  M1; Copper IUD inserted ; LMP- 11/10/2022, bleed 5 days  GYN at [facility]. Next PAP due ; hx abnormal PAP in the past; will get records    ROS:   Review of Systems   Constitutional: Negative.  Negative for fever and malaise/fatigue.   HENT: Negative.     Eyes: Negative.    Respiratory:  Negative for cough, sputum production and shortness of breath.    Cardiovascular:  Negative for chest pain, palpitations and leg swelling.   Gastrointestinal: Negative.         Painless, reducible bulge above belly button   Genitourinary: Negative.    Musculoskeletal: Negative.    Neurological: Negative.    Endo/Heme/Allergies: Negative.    Psychiatric/Behavioral: Negative.         Objective:     Exam: /70   Pulse 73   Temp 36.4 °C (97.6 °F) (Tympanic)   Resp 16   Ht 1.626 m (5' 4\")   Wt 78 kg (172 lb)   SpO2 98%  Body mass index is 29.52 kg/m².    Physical Exam  Constitutional:       Appearance: Normal appearance.   Cardiovascular:      Rate and Rhythm: Normal rate and regular rhythm.      Pulses: Normal pulses.      Heart sounds: Normal heart " sounds.   Pulmonary:      Effort: Pulmonary effort is normal.      Breath sounds: Normal breath sounds.   Abdominal:      General: Abdomen is flat. Bowel sounds are normal.      Palpations: Abdomen is soft.   Musculoskeletal:         General: Normal range of motion.      Cervical back: Normal range of motion and neck supple.   Skin:     General: Skin is warm and dry.   Neurological:      General: No focal deficit present.      Mental Status: She is alert and oriented to person, place, and time.   Psychiatric:         Mood and Affect: Mood normal.         Behavior: Behavior normal.         Thought Content: Thought content normal.         Judgment: Judgment normal.     Assessment & Plan:   32 y.o. female with the following -    Problem List Items Addressed This Visit       Abdominal wall bulge    Relevant Orders    US-ABDOMEN COMPLETE SURVEY    Chronic pain of left knee    Relevant Orders    DX-KNEE 2- LEFT     Other Visit Diagnoses       Screening for metabolic disorder        Relevant Orders    CBC WITHOUT DIFFERENTIAL    Comp Metabolic Panel    Lipid Profile    TSH WITH REFLEX TO FT4    Encounter to establish care        chart reviewed, labs/imaging ordered          Return in about 6 months (around 5/17/2023) for Annual Visit.    Please note that this dictation was created using voice recognition software. I have made every reasonable attempt to correct obvious errors, but I expect that there are errors of grammar and possibly content that I did not discover before finalizing the note.

## 2022-12-13 ENCOUNTER — APPOINTMENT (OUTPATIENT)
Dept: RADIOLOGY | Facility: MEDICAL CENTER | Age: 32
End: 2022-12-13
Attending: NURSE PRACTITIONER
Payer: COMMERCIAL

## 2022-12-13 DIAGNOSIS — G89.29 CHRONIC PAIN OF LEFT KNEE: ICD-10-CM

## 2022-12-13 DIAGNOSIS — R19.00 ABDOMINAL WALL BULGE: ICD-10-CM

## 2022-12-13 DIAGNOSIS — M25.562 CHRONIC PAIN OF LEFT KNEE: ICD-10-CM

## 2022-12-13 PROCEDURE — 76705 ECHO EXAM OF ABDOMEN: CPT

## 2022-12-13 PROCEDURE — 73560 X-RAY EXAM OF KNEE 1 OR 2: CPT | Mod: LT

## 2022-12-16 ENCOUNTER — PATIENT MESSAGE (OUTPATIENT)
Dept: MEDICAL GROUP | Facility: PHYSICIAN GROUP | Age: 32
End: 2022-12-16
Payer: COMMERCIAL

## 2022-12-16 DIAGNOSIS — G89.29 CHRONIC PAIN OF LEFT KNEE: ICD-10-CM

## 2022-12-16 DIAGNOSIS — M25.562 CHRONIC PAIN OF LEFT KNEE: ICD-10-CM

## 2022-12-16 NOTE — PROGRESS NOTES
1. Chronic pain of left knee  Normal xray  - Referral to Physical Therapy     Past Medical History:   Diagnosis Date    Bipolar 1 disorder     Diabetes mellitus        Past Surgical History:   Procedure Laterality Date     SECTION         Review of patient's allergies indicates:   Allergen Reactions    Codeine     Zofran (as hydrochloride) [ondansetron hcl] Hives       No current facility-administered medications on file prior to encounter.      Current Outpatient Medications on File Prior to Encounter   Medication Sig    diphenhydrAMINE (SOMINEX) 25 mg tablet Take 25 mg by mouth nightly as needed for Insomnia.    divalproex (DEPAKOTE) 250 MG 24 hr tablet Take 250 mg by mouth once daily.    divalproex (DEPAKOTE) 500 MG Tb24 Take 500 mg by mouth once daily.    fluoxetine (PROZAC) 20 MG capsule Take 20 mg by mouth once daily.    ibuprofen (ADVIL,MOTRIN) 800 MG tablet Take 800 mg by mouth 3 (three) times daily.    insulin aspart (NOVOLOG) 100 unit/mL injection Inject 5 Units into the skin 3 (three) times daily before meals.    insulin detemir (LEVEMIR) 100 unit/mL injection Inject 30 Units into the skin every evening.    insulin glargine (LANTUS) 100 unit/mL injection Inject 20 Units into the skin every evening.    multivitamin capsule Take 1 capsule by mouth once daily.    quetiapine (SEROQUEL XR) 300 MG Tb24 Take by mouth once daily.    ranitidine (ZANTAC) 150 MG capsule Take 150 mg by mouth 2 (two) times daily.     Family History     None        Tobacco Use    Smoking status: Never Smoker    Smokeless tobacco: Never Used   Substance and Sexual Activity    Alcohol use: Yes    Drug use: Yes     Types: Marijuana    Sexual activity: No     Review of Systems   Constitutional: Positive for activity change, appetite change and fatigue. Negative for fever.   HENT: Negative for congestion, ear pain, rhinorrhea and sinus pressure.    Eyes: Negative for pain and discharge.   Respiratory: Negative for cough, chest tightness, shortness of breath and wheezing.     Cardiovascular: Negative for chest pain and leg swelling.   Gastrointestinal: Positive for abdominal pain, nausea and vomiting. Negative for abdominal distention and diarrhea.   Endocrine: Negative for cold intolerance and heat intolerance.   Genitourinary: Negative for difficulty urinating, flank pain, frequency, hematuria and urgency.   Musculoskeletal: Positive for arthralgias and myalgias. Negative for joint swelling.   Allergic/Immunologic: Negative for environmental allergies and food allergies.   Neurological: Positive for dizziness and weakness. Negative for light-headedness and headaches.   Hematological: Does not bruise/bleed easily.   Psychiatric/Behavioral: Negative for agitation, behavioral problems and decreased concentration.     Objective:     Vital Signs (Most Recent):  Temp: 99.1 °F (37.3 °C) (12/21/18 1941)  Pulse: 104 (12/21/18 2001)  Resp: 15 (12/21/18 2001)  BP: 127/79 (12/21/18 2001)  SpO2: 100 % (12/21/18 2001) Vital Signs (24h Range):  Temp:  [98 °F (36.7 °C)-99.1 °F (37.3 °C)] 99.1 °F (37.3 °C)  Pulse:  [] 104  Resp:  [15-18] 15  SpO2:  [100 %] 100 %  BP: (109-127)/(74-84) 127/79     Weight: 53.5 kg (118 lb)  Body mass index is 18.48 kg/m².    Physical Exam   Constitutional: She is oriented to person, place, and time. She appears well-developed and well-nourished.   HENT:   Head: Normocephalic.   Eyes: Conjunctivae are normal. Right eye exhibits no discharge. Left eye exhibits no discharge.   Neck: Normal range of motion. Neck supple.   Cardiovascular: Regular rhythm, normal heart sounds and intact distal pulses. Tachycardia present.   Pulses:       Radial pulses are 2+ on the right side, and 2+ on the left side.        Dorsalis pedis pulses are 1+ on the right side, and 1+ on the left side.   Pulmonary/Chest: Effort normal and breath sounds normal. No respiratory distress.   Abdominal: Soft. She exhibits no distension. Bowel sounds are decreased. There is generalized tenderness.    Musculoskeletal: Normal range of motion.   Neurological: She is alert and oriented to person, place, and time.   Skin: Skin is warm and dry.   Psychiatric: She has a normal mood and affect. Her behavior is normal. Thought content normal.           Significant Labs:   CBC:   Recent Labs   Lab 12/21/18  1616   WBC 5.51   HGB 10.9*   HCT 34.7*        CMP:   Recent Labs   Lab 12/21/18  1616      K 6.0*      CO2 12*   *   BUN 20   CREATININE 1.2   CALCIUM 9.9   ANIONGAP 26*   EGFRNONAA >60       Significant Imaging: I have reviewed all pertinent imaging results/findings within the past 24 hours.

## 2023-05-18 ENCOUNTER — OFFICE VISIT (OUTPATIENT)
Dept: MEDICAL GROUP | Facility: PHYSICIAN GROUP | Age: 33
End: 2023-05-18
Payer: COMMERCIAL

## 2023-05-18 VITALS
RESPIRATION RATE: 16 BRPM | WEIGHT: 174 LBS | SYSTOLIC BLOOD PRESSURE: 114 MMHG | OXYGEN SATURATION: 98 % | BODY MASS INDEX: 29.71 KG/M2 | DIASTOLIC BLOOD PRESSURE: 78 MMHG | TEMPERATURE: 98.8 F | HEART RATE: 64 BPM | HEIGHT: 64 IN

## 2023-05-18 DIAGNOSIS — Z00.00 ENCOUNTER FOR PREVENTATIVE ADULT HEALTH CARE EXAMINATION: ICD-10-CM

## 2023-05-18 DIAGNOSIS — E78.2 MIXED HYPERLIPIDEMIA: ICD-10-CM

## 2023-05-18 DIAGNOSIS — R74.8 ELEVATED ALKALINE PHOSPHATASE LEVEL: ICD-10-CM

## 2023-05-18 PROCEDURE — 99395 PREV VISIT EST AGE 18-39: CPT | Performed by: NURSE PRACTITIONER

## 2023-05-18 PROCEDURE — 3078F DIAST BP <80 MM HG: CPT | Performed by: NURSE PRACTITIONER

## 2023-05-18 PROCEDURE — 3074F SYST BP LT 130 MM HG: CPT | Performed by: NURSE PRACTITIONER

## 2023-05-18 ASSESSMENT — FIBROSIS 4 INDEX: FIB4 SCORE: 0.37

## 2023-05-18 ASSESSMENT — PATIENT HEALTH QUESTIONNAIRE - PHQ9: CLINICAL INTERPRETATION OF PHQ2 SCORE: 0

## 2023-05-18 NOTE — ASSESSMENT & PLAN NOTE
Latest Reference Range & Units 11/17/22 11:11   Cholesterol,Tot 100 - 199 mg/dL 224 (H)   Triglycerides 0 - 149 mg/dL 155 (H)   HDL >=40 mg/dL 55   LDL <100 mg/dL 138 (H)     Chronic; LDL goal < 100; not currently on cholesterol reducing medication. She does not smoke. No prior hx of CVD, PAD, hypertension. Also had elevated ALP with labs in 11/2022 (AST/ALT were in normal range)  - counseled on heart healthy eating; she has cut back on fried foods; she is eating leaner protein and has increased fiber intake with vegetables.   - recheck fasting lipid with CMP

## 2023-05-18 NOTE — PROGRESS NOTES
Subjective:     CC:   Chief Complaint   Patient presents with    Annual Wellness Visit       HPI:   32 y.o. female here today for annual exam. She is feeling well and denies any complaints. We will also review her annual results today.     Ob-Gyn/ History:    Patient has GYN provider: yes; RACHEAL Phillips  /Para:  M1  Last Pap Smear: 3/2023. hx abnormal PAP in the past; will get records  Gyn Surgery:  none.  Current Contraceptive Method:  IUD removed 3/2023 (replaced twice by GYN in 2023; kept moving so pt finally opted out;  to get vasectomy). currently sexually active.  Last menstrual period:  2023. Periods regular. moderate bleeding. Cramping is mild.   She does not take OTC analgesics for cramps.  No significant bloating/fluid retention, pelvic pain, or dyspareunia. No vaginal discharge  Post-menopausal bleeding: n/a  Urinary incontinence: denies  Folate intake: none    Health Maintenance  Advanced directive: does not currently have one  Osteoporosis Screen/ DEXA: n/a; can start dexa at age 65  PT/vit D for falls prevention: no recent falls/fractures  Cholesterol Screening: TC//138  Diabetes Screening: FBG 65  Aspirin Use: n/a  Diet: heart healthy eating; adequate fiber, protein  Exercise: recommend aerobic/resistance training 3-5x/week  Substance Abuse: denies  Safe in relationship.   Seat belts discussed.  Sun protection used.    Cancer screening  Colorectal Cancer Screening: n/a; can start screen at age 45  Lung Cancer Screening: n/a; non smoker  Cervical Cancer Screening: 3/2023 with RACHEAL phillips. Get results  Breast Cancer Screening: n/a; start at age 50    Infectious disease screening/Immunizations  --STI Screening: -ve GC/Chlam   --Practices safe sex.  --HIV Screening: -ve   --Hepatitis C Screening: declined  --Immunizations: up to date; declined 2nd dose HPV vaccine  Immunization History   Administered Date(s) Administered    9VHPV VACCINE 2-3 DOSE IM (GARDASIL  9) 04/15/2016    INFLUENZA TIV (IM) 02/25/2011    Tdap Vaccine 02/25/2011, 01/31/2018       She  has a past medical history of Abnormal Pap smear and cervical HPV (human papillomavirus) (10/12/2010) and Pregnant.    She has no past medical history of Asthma, Diabetes (HCC), Hemorrhagic disorder (HCC), Hypertension, or Seizure (HCC).  She  has a past surgical history that includes primary c section (3/28/2018).    Family History   Problem Relation Age of Onset    Diabetes Maternal Grandmother     Diabetes Maternal Grandfather     Diabetes Paternal Grandmother     Diabetes Paternal Grandfather     Cancer Paternal Grandfather         lung       Social History     Socioeconomic History    Marital status:      Spouse name: Not on file    Number of children: Not on file    Years of education: Not on file    Highest education level: GED or equivalent   Occupational History    Not on file   Tobacco Use    Smoking status: Never    Smokeless tobacco: Never   Vaping Use    Vaping Use: Never used   Substance and Sexual Activity    Alcohol use: No     Alcohol/week: 1.8 oz     Types: 3 Cans of beer per week    Drug use: No    Sexual activity: Yes     Partners: Male   Other Topics Concern    Not on file   Social History Narrative    Not on file     Social Determinants of Health     Financial Resource Strain: Low Risk  (11/14/2022)    Overall Financial Resource Strain (CARDIA)     Difficulty of Paying Living Expenses: Not very hard   Food Insecurity: No Food Insecurity (11/14/2022)    Hunger Vital Sign     Worried About Running Out of Food in the Last Year: Never true     Ran Out of Food in the Last Year: Never true   Transportation Needs: No Transportation Needs (11/14/2022)    PRAPARE - Transportation     Lack of Transportation (Medical): No     Lack of Transportation (Non-Medical): No   Physical Activity: Inactive (11/14/2022)    Exercise Vital Sign     Days of Exercise per Week: 0 days     Minutes of Exercise per  Session: 0 min   Stress: Stress Concern Present (11/14/2022)    [employer] [place] [employer] - Occupational Stress Questionnaire     Feeling of Stress : To some extent   Social Connections: Moderately Isolated (11/14/2022)    Social Connection and Isolation Panel [NHANES]     Frequency of Communication with Friends and Family: Twice a week     Frequency of Social Gatherings with Friends and Family: Once a week     Attends Restorationism Services: Never     Active Member of Clubs or Organizations: No     Attends Club or Organization Meetings: Patient refused     Marital Status:    Intimate Partner Violence: Not on file   Housing Stability: Unknown (11/14/2022)    Housing Stability Vital Sign     Unable to Pay for Housing in the Last Year: No     Number of Places Lived in the Last Year: Not on file     Unstable Housing in the Last Year: No       Patient Active Problem List    Diagnosis Date Noted    Mixed hyperlipidemia 05/18/2023    Elevated alkaline phosphatase level 05/18/2023    Abdominal wall bulge 11/17/2022    Chronic pain of left knee 11/17/2022    History of depression 11/17/2022         No current outpatient medications on file.     No current facility-administered medications for this visit.     No Known Allergies    Review of Systems   Constitutional: Negative for fever, chills and malaise/fatigue.   HENT: Negative for congestion.    Eyes: Negative for pain.    Respiratory: Negative for cough and shortness of breath.  Cardiovascular: Negative for leg swelling.   Gastrointestinal: Negative for nausea, vomiting, abdominal pain and diarrhea.   Genitourinary: Negative for dysuria and hematuria.   Skin: Negative for rash.   Neurological: Negative for dizziness, focal weakness and headaches.   Endo/Heme/Allergies: Does not bleed easily.   Psychiatric/Behavioral: Negative for depression.  The patient is not nervous/anxious.      Objective:     /78 (BP Location: Left arm, Patient Position:  "Sitting, BP Cuff Size: Adult)   Pulse 64   Temp 37.1 °C (98.8 °F) (Temporal)   Resp 16   Ht 1.626 m (5' 4\")   Wt 78.9 kg (174 lb)   SpO2 98%   BMI 29.87 kg/m²   Body mass index is 29.87 kg/m².  Wt Readings from Last 4 Encounters:   05/18/23 78.9 kg (174 lb)   11/17/22 78 kg (172 lb)   10/14/22 74.4 kg (164 lb)   08/16/21 72.6 kg (160 lb)       Physical Exam:  Constitutional: Well-developed and well-nourished. Not diaphoretic. No distress.   Skin: Skin is warm and dry. No rash noted.  Cardiovascular: Regular rate and rhythm, S1 and S2 without murmur, rubs, or gallops.  Lungs: Normal inspiratory effort, CTA bilaterally, no wheezes/rhonchi/rales  Breast: SBE per patient; no new skin/breast changes reported  Abdomen: Soft, non tender, and without distention. Active bowel sounds in all four quadrants. No rebound, guarding, masses or HSM.  : deferred to GYN visit  Extremities: No cyanosis, clubbing, erythema, nor edema. Distal pulses intact and symmetric.   Musculoskeletal: All major joints AROM full in all directions without pain.  Neurological: Alert and oriented x 3. DTRs 2+/3 and symmetric. No cranial nerve deficit. 5/5 myotomes. Sensation intact.   Psychiatric:  Behavior, mood, and affect are appropriate.    Assessment and Plan:   1. Elevated alkaline phosphatase level  2. Mixed hyperlipidemia   Latest Reference Range & Units 11/17/22 11:11   Cholesterol,Tot 100 - 199 mg/dL 224 (H)   Triglycerides 0 - 149 mg/dL 155 (H)   HDL >=40 mg/dL 55   LDL <100 mg/dL 138 (H)     Chronic; LDL goal < 100; not currently on cholesterol reducing medication. She does not smoke. No prior hx of CVD, PAD, hypertension. Also had elevated ALP with labs in 11/2022 (AST/ALT were in normal range)  - counseled on heart healthy eating; she has cut back on fried foods; she is eating leaner protein and has increased fiber intake with vegetables.   - recheck fasting lipid with CMP  - Comp Metabolic Panel; Future    3. Encounter for " preventative adult health care examination  Health Care Maintenance:     Labs per orders  Immunizations per orders; none due today.  Patient counseled about skin care, diet, supplements, prenatal vitamins, safe sex and exercise.      Follow-up: Return in about 1 year (around 5/18/2024) for Annual Visit.

## 2023-05-18 NOTE — LETTER
[facility]  RACHEL MarkhamP.  [address]  [place] [address]  Fax: [phone]   Authorization for Release/Disclosure of   Protected Health Information   Name: ISELA HANEY : 1990 SSN: [id number]   Address: [facility] [address] Phone:    [phone] (home)    I authorize the entity listed below to release/disclose the PHI below to:   [facility]/Shereen Jewell D.N.P. and Shereen Jewell D.N.P.   Provider or Entity Name:[facility]     Address   City, State, Zip   Phone:      Fax:     Reason for request: continuity of care   Information to be released:    [  ] LAST COLONOSCOPY,  including any PATH REPORT and follow-up  [  ] LAST FIT/COLOGUARD RESULT [  ] LAST DEXA  [  ] LAST MAMMOGRAM  [xx  ] LAST PAP  [  ] LAST LABS [  ] RETINA EXAM REPORT  [  ] IMMUNIZATION RECORDS  [  ] Release all info      [  ] Check here and initial the line next to each item to release ALL health information INCLUDING  _____ Care and treatment for drug and / or alcohol abuse  _____ HIV testing, infection status, or AIDS  _____ Genetic Testing    DATES OF SERVICE OR TIME PERIOD TO BE DISCLOSED: _____________  I understand and acknowledge that:  * This Authorization may be revoked at any time by you in writing, except if your health information has already been used or disclosed.  * Your health information that will be used or disclosed as a result of you signing this authorization could be re-disclosed by the recipient. If this occurs, your re-disclosed health information may no longer be protected by State or Federal laws.  * You may refuse to sign this Authorization. Your refusal will not affect your ability to obtain treatment.  * This Authorization becomes effective upon signing and will  on (date) __________.      If no date is indicated, this Authorization will  one (1) year from the signature date.    Name: Isela Haney  Signature: Date:   2023     PLEASE FAX REQUESTED  RECORDS BACK TO: [phone]

## 2023-05-19 ENCOUNTER — HOSPITAL ENCOUNTER (OUTPATIENT)
Dept: LAB | Facility: MEDICAL CENTER | Age: 33
End: 2023-05-19
Attending: NURSE PRACTITIONER
Payer: COMMERCIAL

## 2023-05-19 DIAGNOSIS — E78.2 MIXED HYPERLIPIDEMIA: ICD-10-CM

## 2023-05-19 DIAGNOSIS — R74.8 ELEVATED ALKALINE PHOSPHATASE LEVEL: ICD-10-CM

## 2023-05-19 LAB
ALBUMIN SERPL BCP-MCNC: 4.3 G/DL (ref 3.2–4.9)
ALBUMIN/GLOB SERPL: 1.3 G/DL
ALP SERPL-CCNC: 100 U/L (ref 30–99)
ALT SERPL-CCNC: 13 U/L (ref 2–50)
ANION GAP SERPL CALC-SCNC: 10 MMOL/L (ref 7–16)
AST SERPL-CCNC: 16 U/L (ref 12–45)
BILIRUB SERPL-MCNC: 0.6 MG/DL (ref 0.1–1.5)
BUN SERPL-MCNC: 12 MG/DL (ref 8–22)
CALCIUM ALBUM COR SERPL-MCNC: 8.9 MG/DL (ref 8.5–10.5)
CALCIUM SERPL-MCNC: 9.1 MG/DL (ref 8.5–10.5)
CHLORIDE SERPL-SCNC: 106 MMOL/L (ref 96–112)
CHOLEST SERPL-MCNC: 168 MG/DL (ref 100–199)
CO2 SERPL-SCNC: 23 MMOL/L (ref 20–33)
CREAT SERPL-MCNC: 0.68 MG/DL (ref 0.5–1.4)
FASTING STATUS PATIENT QL REPORTED: NORMAL
GFR SERPLBLD CREATININE-BSD FMLA CKD-EPI: 120 ML/MIN/1.73 M 2
GLOBULIN SER CALC-MCNC: 3.2 G/DL (ref 1.9–3.5)
GLUCOSE SERPL-MCNC: 83 MG/DL (ref 65–99)
HDLC SERPL-MCNC: 45 MG/DL
LDLC SERPL CALC-MCNC: 108 MG/DL
POTASSIUM SERPL-SCNC: 4.2 MMOL/L (ref 3.6–5.5)
PROT SERPL-MCNC: 7.5 G/DL (ref 6–8.2)
SODIUM SERPL-SCNC: 139 MMOL/L (ref 135–145)
TRIGL SERPL-MCNC: 77 MG/DL (ref 0–149)

## 2023-05-19 PROCEDURE — 36415 COLL VENOUS BLD VENIPUNCTURE: CPT

## 2023-05-19 PROCEDURE — 80061 LIPID PANEL: CPT

## 2023-05-19 PROCEDURE — 80053 COMPREHEN METABOLIC PANEL: CPT

## 2023-08-03 ENCOUNTER — OFFICE VISIT (OUTPATIENT)
Dept: URGENT CARE | Facility: PHYSICIAN GROUP | Age: 33
End: 2023-08-03
Payer: COMMERCIAL

## 2023-08-03 VITALS
SYSTOLIC BLOOD PRESSURE: 122 MMHG | BODY MASS INDEX: 29.02 KG/M2 | WEIGHT: 170 LBS | RESPIRATION RATE: 16 BRPM | TEMPERATURE: 99 F | DIASTOLIC BLOOD PRESSURE: 78 MMHG | HEIGHT: 64 IN | OXYGEN SATURATION: 96 % | HEART RATE: 93 BPM

## 2023-08-03 DIAGNOSIS — J03.90 EXUDATIVE TONSILLITIS: ICD-10-CM

## 2023-08-03 PROCEDURE — 3078F DIAST BP <80 MM HG: CPT | Performed by: FAMILY MEDICINE

## 2023-08-03 PROCEDURE — 3074F SYST BP LT 130 MM HG: CPT | Performed by: FAMILY MEDICINE

## 2023-08-03 PROCEDURE — 99213 OFFICE O/P EST LOW 20 MIN: CPT | Performed by: FAMILY MEDICINE

## 2023-08-03 RX ORDER — DEXAMETHASONE SODIUM PHOSPHATE 10 MG/ML
10 INJECTION INTRAMUSCULAR; INTRAVENOUS ONCE
Status: COMPLETED | OUTPATIENT
Start: 2023-08-03 | End: 2023-08-03

## 2023-08-03 RX ORDER — AMOXICILLIN AND CLAVULANATE POTASSIUM 875; 125 MG/1; MG/1
1 TABLET, FILM COATED ORAL 2 TIMES DAILY
Qty: 20 TABLET | Refills: 0 | Status: SHIPPED | OUTPATIENT
Start: 2023-08-03 | End: 2023-08-13

## 2023-08-03 RX ORDER — LIDOCAINE HYDROCHLORIDE 20 MG/ML
15 SOLUTION OROPHARYNGEAL EVERY 4 HOURS PRN
Qty: 120 ML | Refills: 0 | Status: SHIPPED | OUTPATIENT
Start: 2023-08-03

## 2023-08-03 RX ADMIN — DEXAMETHASONE SODIUM PHOSPHATE 10 MG: 10 INJECTION INTRAMUSCULAR; INTRAVENOUS at 10:50

## 2023-08-03 ASSESSMENT — ENCOUNTER SYMPTOMS
EYE REDNESS: 0
NAUSEA: 0
WEIGHT LOSS: 0
VOMITING: 0
EYE DISCHARGE: 0
MYALGIAS: 0

## 2023-08-03 ASSESSMENT — FIBROSIS 4 INDEX: FIB4 SCORE: 0.38

## 2023-08-03 NOTE — PROGRESS NOTES
"Ryan Haney is a 32 y.o. female who presents with Other (Right tonsil is swollen, has puss, chills, fever  )            1 day ST, right tonsil swelling with pus. No fever. No cough. No rash. Does not participate in contact sports. Tolerating fluids with normal urine output. Advil and Tylenol with some improvement symptoms. No other aggravating or alleviating factors.        Review of Systems   Constitutional:  Negative for malaise/fatigue and weight loss.   Eyes:  Negative for discharge and redness.   Gastrointestinal:  Negative for nausea and vomiting.   Musculoskeletal:  Negative for joint pain and myalgias.   Skin:  Negative for itching and rash.              Objective     /78   Pulse 93   Temp 37.2 °C (99 °F) (Temporal)   Resp 16   Ht 1.626 m (5' 4\")   Wt 77.1 kg (170 lb)   SpO2 96%   BMI 29.18 kg/m²      Physical Exam  Constitutional:       General: She is not in acute distress.     Appearance: She is well-developed.   HENT:      Head: Normocephalic and atraumatic.      Nose: Nose normal.      Mouth/Throat:      Mouth: Mucous membranes are moist.      Comments: 2+ right greater than left red tonsils with purulent exudate. There is mild right soft palate swelling without significant uvula deviation.       Eyes:      Conjunctiva/sclera: Conjunctivae normal.   Cardiovascular:      Rate and Rhythm: Normal rate and regular rhythm.      Heart sounds: Normal heart sounds. No murmur heard.  Pulmonary:      Effort: Pulmonary effort is normal.      Breath sounds: Normal breath sounds. No wheezing.   Musculoskeletal:      Cervical back: Neck supple.   Lymphadenopathy:      Cervical: Cervical adenopathy (right) present.   Skin:     General: Skin is warm and dry.      Findings: No rash.   Neurological:      Mental Status: She is alert.                             Assessment & Plan         1. Exudative tonsillitis  amoxicillin-clavulanate (AUGMENTIN) 875-125 MG Tab    dexamethasone " (Decadron) injection (check route below) 10 mg    lidocaine (XYLOCAINE) 2 % Solution    CANCELED: POCT GROUP A STREP, PCR        Differential diagnosis, natural history, supportive care, and indications for immediate follow-up were discussed.     Findings are concerning for early peritonsillar abscess. Will treat empirically. If no improvement or worse then to ED immediately.

## 2024-05-09 ENCOUNTER — DOCUMENTATION (OUTPATIENT)
Dept: HEALTH INFORMATION MANAGEMENT | Facility: OTHER | Age: 34
End: 2024-05-09
Payer: COMMERCIAL

## 2024-06-06 ENCOUNTER — APPOINTMENT (OUTPATIENT)
Dept: MEDICAL GROUP | Facility: PHYSICIAN GROUP | Age: 34
End: 2024-06-06
Payer: COMMERCIAL

## 2024-08-16 ENCOUNTER — TELEPHONE (OUTPATIENT)
Dept: HEALTH INFORMATION MANAGEMENT | Facility: OTHER | Age: 34
End: 2024-08-16
Payer: COMMERCIAL

## (undated) DEVICE — PLUMEPEN ULTRA 3/8 IN X 10 FT HOSE (20EA/CA)

## (undated) DEVICE — LACTATED RINGERS INJ 1000 ML - (14EA/CA 60CA/PF)

## (undated) DEVICE — KIT SYR LS LNDRW FLTRPRO DEV - (200/CA)

## (undated) DEVICE — 0 CHROMIC CT-1

## (undated) DEVICE — CATHETER IV 18 GA X 1-1/4 - SAFETY BRAUN (50/BX)

## (undated) DEVICE — TUBING CLEARLINK DUO-VENT - C-FLO (48EA/CA)

## (undated) DEVICE — CLOSURE PRINEO SKIN - (2EA/BX)

## (undated) DEVICE — SUTURE 3-0 VICRYL PLUS CT-1 - 36 INCH (36/BX)

## (undated) DEVICE — TRAY BLADDER CARE W/ 16 FR FOLEY CATHETER STATLOCK  (10/CA)

## (undated) DEVICE — HEAD HOLDER JUNIOR/ADULT

## (undated) DEVICE — SODIUM CHL IRRIGATION 0.9% 1000ML (12EA/CA)

## (undated) DEVICE — SUTURE 0 36IN PDS + VIO CT-1 (36PK/BX)

## (undated) DEVICE — SUTURE 4-0 27IN VCRL PLUS ANTI (36PK/BX)

## (undated) DEVICE — CANISTER SUCTION 3000ML MECHANICAL FILTER AUTO SHUTOFF MEDI-VAC NONSTERILE LF DISP  (40EA/CA)

## (undated) DEVICE — GLOVE BIOGEL SZ 6.5 SURGICAL PF LTX (50PR/BX 4BX/CA)

## (undated) DEVICE — SET EXTENSION WITH 2 PORTS (48EA/CA) ***PART #2C8610 IS A SUBSTITUTE*****

## (undated) DEVICE — WATER IRRIGATION STERILE 1000ML (12EA/CA)

## (undated) DEVICE — CLOSURE SKIN STRIP 1/2 X 4 IN - (STERI STRIP) (50/BX 4BX/CA)

## (undated) DEVICE — DRESSING INTERCEED ABSORBABLE ADHESION BARRIER TC7 (10EA/CA)

## (undated) DEVICE — TRAY SPINAL ANESTHESIA NON-SAFETY (10/CA)

## (undated) DEVICE — ELECTRODE DUAL RETURN W/ CORD - (50/PK)

## (undated) DEVICE — TAPE CLOTH MEDIPORE 6 INCH - (12RL/CA)

## (undated) DEVICE — PAD BABY LAP 4X18 W/O - RINGS PREWASHED 5/PK 40PK/CS

## (undated) DEVICE — CHLORAPREP 26 ML APPLICATOR - ORANGE TINT(25/CA)

## (undated) DEVICE — RETRACTOR O C SECTION X-LRY - (5/BX)

## (undated) DEVICE — BLANKET UNDERBODY FULL ACCES - (5/CA)

## (undated) DEVICE — SLEEVE, SEQUENTIAL CALF REG

## (undated) DEVICE — DETERGENT RENUZYME PLUS 10 OZ PACKET (50/BX)

## (undated) DEVICE — KIT  I.V. START (100EA/CA)

## (undated) DEVICE — PACK C-SECTION (2EA/CA)